# Patient Record
Sex: MALE | Race: WHITE | Employment: UNEMPLOYED | URBAN - METROPOLITAN AREA
[De-identification: names, ages, dates, MRNs, and addresses within clinical notes are randomized per-mention and may not be internally consistent; named-entity substitution may affect disease eponyms.]

---

## 2019-07-18 ENCOUNTER — HOSPITAL ENCOUNTER (INPATIENT)
Age: 34
LOS: 3 days | Discharge: HOME | DRG: 439 | End: 2019-07-22
Attending: EMERGENCY MEDICINE | Admitting: HOSPITALIST
Payer: COMMERCIAL

## 2019-07-18 ENCOUNTER — APPOINTMENT (OUTPATIENT)
Dept: CT IMAGING | Age: 34
DRG: 439 | End: 2019-07-18
Attending: EMERGENCY MEDICINE
Payer: COMMERCIAL

## 2019-07-18 DIAGNOSIS — K85.90 ACUTE PANCREATITIS, UNSPECIFIED COMPLICATION STATUS, UNSPECIFIED PANCREATITIS TYPE: Primary | ICD-10-CM

## 2019-07-18 LAB
APPEARANCE UR: CLEAR
BACTERIA URNS QL MICRO: NEGATIVE /HPF
BASOPHILS # BLD: 0 K/UL (ref 0–0.1)
BASOPHILS NFR BLD: 0 % (ref 0–1)
BILIRUB UR QL: NEGATIVE
COLOR UR: ABNORMAL
DIFFERENTIAL METHOD BLD: ABNORMAL
EOSINOPHIL # BLD: 0 K/UL (ref 0–0.4)
EOSINOPHIL NFR BLD: 0 % (ref 0–7)
EPITH CASTS URNS QL MICRO: ABNORMAL /LPF
ERYTHROCYTE [DISTWIDTH] IN BLOOD BY AUTOMATED COUNT: 12.5 % (ref 11.5–14.5)
GLUCOSE UR STRIP.AUTO-MCNC: 100 MG/DL
HCT VFR BLD AUTO: 49.1 % (ref 36.6–50.3)
HGB BLD-MCNC: 16.7 G/DL (ref 12.1–17)
HGB UR QL STRIP: ABNORMAL
HYALINE CASTS URNS QL MICRO: ABNORMAL /LPF (ref 0–5)
IMM GRANULOCYTES # BLD AUTO: 0 K/UL
IMM GRANULOCYTES NFR BLD AUTO: 0 %
KETONES UR QL STRIP.AUTO: NEGATIVE MG/DL
LEUKOCYTE ESTERASE UR QL STRIP.AUTO: NEGATIVE
LIPASE SERPL-CCNC: >3000 U/L (ref 73–393)
LYMPHOCYTES # BLD: 0 K/UL (ref 0.8–3.5)
LYMPHOCYTES NFR BLD: 0 % (ref 12–49)
MCH RBC QN AUTO: 30.6 PG (ref 26–34)
MCHC RBC AUTO-ENTMCNC: 34 G/DL (ref 30–36.5)
MCV RBC AUTO: 90.1 FL (ref 80–99)
MONOCYTES # BLD: 0.4 K/UL (ref 0–1)
MONOCYTES NFR BLD: 2 % (ref 5–13)
NEUTS SEG # BLD: 18.2 K/UL (ref 1.8–8)
NEUTS SEG NFR BLD: 98 % (ref 32–75)
NITRITE UR QL STRIP.AUTO: NEGATIVE
NRBC # BLD: 0 K/UL (ref 0–0.01)
NRBC BLD-RTO: 0 PER 100 WBC
PH UR STRIP: 6 [PH] (ref 5–8)
PLATELET # BLD AUTO: 221 K/UL (ref 150–400)
PMV BLD AUTO: 11.6 FL (ref 8.9–12.9)
PROT UR STRIP-MCNC: 100 MG/DL
RBC # BLD AUTO: 5.45 M/UL (ref 4.1–5.7)
RBC #/AREA URNS HPF: ABNORMAL /HPF (ref 0–5)
RBC MORPH BLD: ABNORMAL
SP GR UR REFRACTOMETRY: >1.03 (ref 1–1.03)
UROBILINOGEN UR QL STRIP.AUTO: 0.2 EU/DL (ref 0.2–1)
WBC # BLD AUTO: 18.6 K/UL (ref 4.1–11.1)
WBC URNS QL MICRO: ABNORMAL /HPF (ref 0–4)

## 2019-07-18 PROCEDURE — 85025 COMPLETE CBC W/AUTO DIFF WBC: CPT

## 2019-07-18 PROCEDURE — 81001 URINALYSIS AUTO W/SCOPE: CPT

## 2019-07-18 PROCEDURE — 80061 LIPID PANEL: CPT

## 2019-07-18 PROCEDURE — 80053 COMPREHEN METABOLIC PANEL: CPT

## 2019-07-18 PROCEDURE — 36415 COLL VENOUS BLD VENIPUNCTURE: CPT

## 2019-07-18 PROCEDURE — 84100 ASSAY OF PHOSPHORUS: CPT

## 2019-07-18 PROCEDURE — 83690 ASSAY OF LIPASE: CPT

## 2019-07-18 PROCEDURE — 96361 HYDRATE IV INFUSION ADD-ON: CPT

## 2019-07-18 PROCEDURE — 74011000258 HC RX REV CODE- 258: Performed by: RADIOLOGY

## 2019-07-18 PROCEDURE — 99284 EMERGENCY DEPT VISIT MOD MDM: CPT

## 2019-07-18 PROCEDURE — 74011636320 HC RX REV CODE- 636/320: Performed by: RADIOLOGY

## 2019-07-18 PROCEDURE — 96375 TX/PRO/DX INJ NEW DRUG ADDON: CPT

## 2019-07-18 PROCEDURE — 74177 CT ABD & PELVIS W/CONTRAST: CPT

## 2019-07-18 PROCEDURE — 83735 ASSAY OF MAGNESIUM: CPT

## 2019-07-18 PROCEDURE — 96374 THER/PROPH/DIAG INJ IV PUSH: CPT

## 2019-07-18 PROCEDURE — 96376 TX/PRO/DX INJ SAME DRUG ADON: CPT

## 2019-07-18 PROCEDURE — 74011250636 HC RX REV CODE- 250/636: Performed by: EMERGENCY MEDICINE

## 2019-07-18 RX ORDER — ONDANSETRON 2 MG/ML
4 INJECTION INTRAMUSCULAR; INTRAVENOUS
Status: COMPLETED | OUTPATIENT
Start: 2019-07-18 | End: 2019-07-18

## 2019-07-18 RX ORDER — SODIUM CHLORIDE 0.9 % (FLUSH) 0.9 %
10 SYRINGE (ML) INJECTION
Status: COMPLETED | OUTPATIENT
Start: 2019-07-18 | End: 2019-07-18

## 2019-07-18 RX ORDER — HYDROMORPHONE HYDROCHLORIDE 2 MG/ML
0.5 INJECTION, SOLUTION INTRAMUSCULAR; INTRAVENOUS; SUBCUTANEOUS ONCE
Status: COMPLETED | OUTPATIENT
Start: 2019-07-18 | End: 2019-07-18

## 2019-07-18 RX ORDER — HYDROMORPHONE HYDROCHLORIDE 1 MG/ML
0.5 INJECTION, SOLUTION INTRAMUSCULAR; INTRAVENOUS; SUBCUTANEOUS ONCE
Status: COMPLETED | OUTPATIENT
Start: 2019-07-18 | End: 2019-07-19

## 2019-07-18 RX ADMIN — HYDROMORPHONE HYDROCHLORIDE 0.5 MG: 2 INJECTION, SOLUTION INTRAMUSCULAR; INTRAVENOUS; SUBCUTANEOUS at 22:01

## 2019-07-18 RX ADMIN — IOPAMIDOL 100 ML: 755 INJECTION, SOLUTION INTRAVENOUS at 23:01

## 2019-07-18 RX ADMIN — Medication 10 ML: at 23:01

## 2019-07-18 RX ADMIN — SODIUM CHLORIDE 100 ML: 900 INJECTION, SOLUTION INTRAVENOUS at 23:01

## 2019-07-18 RX ADMIN — SODIUM CHLORIDE 1000 ML: 900 INJECTION, SOLUTION INTRAVENOUS at 22:01

## 2019-07-18 RX ADMIN — ONDANSETRON 4 MG: 2 INJECTION INTRAMUSCULAR; INTRAVENOUS at 22:01

## 2019-07-19 ENCOUNTER — APPOINTMENT (OUTPATIENT)
Dept: ULTRASOUND IMAGING | Age: 34
DRG: 439 | End: 2019-07-19
Attending: HOSPITALIST
Payer: COMMERCIAL

## 2019-07-19 PROBLEM — K85.90 ACUTE PANCREATITIS: Status: ACTIVE | Noted: 2019-07-19

## 2019-07-19 LAB
ALBUMIN SERPL-MCNC: 4.4 G/DL (ref 3.5–5)
ALBUMIN/GLOB SERPL: 1.3 {RATIO} (ref 1.1–2.2)
ALP SERPL-CCNC: 89 U/L (ref 45–117)
ALT SERPL-CCNC: 27 U/L (ref 12–78)
ANION GAP SERPL CALC-SCNC: 10 MMOL/L (ref 5–15)
AST SERPL-CCNC: 19 U/L (ref 15–37)
BILIRUB SERPL-MCNC: 0.9 MG/DL (ref 0.2–1)
BUN SERPL-MCNC: 15 MG/DL (ref 6–20)
BUN/CREAT SERPL: 14 (ref 12–20)
CALCIUM SERPL-MCNC: 9.8 MG/DL (ref 8.5–10.1)
CHLORIDE SERPL-SCNC: 102 MMOL/L (ref 97–108)
CHOLEST SERPL-MCNC: 175 MG/DL
CO2 SERPL-SCNC: 25 MMOL/L (ref 21–32)
CREAT SERPL-MCNC: 1.07 MG/DL (ref 0.7–1.3)
GLOBULIN SER CALC-MCNC: 3.4 G/DL (ref 2–4)
GLUCOSE SERPL-MCNC: 157 MG/DL (ref 65–100)
HDLC SERPL-MCNC: 66 MG/DL
HDLC SERPL: 2.7 {RATIO} (ref 0–5)
LDLC SERPL CALC-MCNC: 101.8 MG/DL (ref 0–100)
LIPID PROFILE,FLP: ABNORMAL
MAGNESIUM SERPL-MCNC: 1.9 MG/DL (ref 1.6–2.4)
PHOSPHATE SERPL-MCNC: 3.4 MG/DL (ref 2.6–4.7)
POTASSIUM SERPL-SCNC: 4.4 MMOL/L (ref 3.5–5.1)
PROT SERPL-MCNC: 7.8 G/DL (ref 6.4–8.2)
SODIUM SERPL-SCNC: 137 MMOL/L (ref 136–145)
TRIGL SERPL-MCNC: 36 MG/DL (ref ?–150)
VLDLC SERPL CALC-MCNC: 7.2 MG/DL

## 2019-07-19 PROCEDURE — 76705 ECHO EXAM OF ABDOMEN: CPT

## 2019-07-19 PROCEDURE — 74011250637 HC RX REV CODE- 250/637: Performed by: HOSPITALIST

## 2019-07-19 PROCEDURE — 65270000029 HC RM PRIVATE

## 2019-07-19 PROCEDURE — 74011250636 HC RX REV CODE- 250/636: Performed by: NURSE PRACTITIONER

## 2019-07-19 PROCEDURE — 96361 HYDRATE IV INFUSION ADD-ON: CPT

## 2019-07-19 PROCEDURE — 74011000258 HC RX REV CODE- 258: Performed by: HOSPITALIST

## 2019-07-19 PROCEDURE — 74011250636 HC RX REV CODE- 250/636: Performed by: HOSPITALIST

## 2019-07-19 PROCEDURE — 74011250636 HC RX REV CODE- 250/636: Performed by: EMERGENCY MEDICINE

## 2019-07-19 RX ORDER — HYDROMORPHONE HYDROCHLORIDE 1 MG/ML
1 INJECTION, SOLUTION INTRAMUSCULAR; INTRAVENOUS; SUBCUTANEOUS
Status: DISCONTINUED | OUTPATIENT
Start: 2019-07-19 | End: 2019-07-22 | Stop reason: HOSPADM

## 2019-07-19 RX ORDER — ONDANSETRON 2 MG/ML
4 INJECTION INTRAMUSCULAR; INTRAVENOUS
Status: DISCONTINUED | OUTPATIENT
Start: 2019-07-19 | End: 2019-07-22 | Stop reason: HOSPADM

## 2019-07-19 RX ORDER — ENOXAPARIN SODIUM 100 MG/ML
40 INJECTION SUBCUTANEOUS EVERY 24 HOURS
Status: DISCONTINUED | OUTPATIENT
Start: 2019-07-19 | End: 2019-07-22 | Stop reason: HOSPADM

## 2019-07-19 RX ORDER — ACETAMINOPHEN 325 MG/1
650 TABLET ORAL
Status: DISCONTINUED | OUTPATIENT
Start: 2019-07-19 | End: 2019-07-22 | Stop reason: HOSPADM

## 2019-07-19 RX ORDER — SODIUM CHLORIDE 0.9 % (FLUSH) 0.9 %
5-40 SYRINGE (ML) INJECTION AS NEEDED
Status: DISCONTINUED | OUTPATIENT
Start: 2019-07-19 | End: 2019-07-22 | Stop reason: HOSPADM

## 2019-07-19 RX ORDER — SODIUM CHLORIDE 0.9 % (FLUSH) 0.9 %
5-40 SYRINGE (ML) INJECTION EVERY 8 HOURS
Status: DISCONTINUED | OUTPATIENT
Start: 2019-07-19 | End: 2019-07-22 | Stop reason: HOSPADM

## 2019-07-19 RX ORDER — METRONIDAZOLE 500 MG/100ML
500 INJECTION, SOLUTION INTRAVENOUS EVERY 12 HOURS
Status: DISCONTINUED | OUTPATIENT
Start: 2019-07-19 | End: 2019-07-19

## 2019-07-19 RX ORDER — SODIUM CHLORIDE, SODIUM LACTATE, POTASSIUM CHLORIDE, CALCIUM CHLORIDE 600; 310; 30; 20 MG/100ML; MG/100ML; MG/100ML; MG/100ML
200 INJECTION, SOLUTION INTRAVENOUS CONTINUOUS
Status: DISCONTINUED | OUTPATIENT
Start: 2019-07-19 | End: 2019-07-22

## 2019-07-19 RX ORDER — OXYCODONE HYDROCHLORIDE 5 MG/1
5 TABLET ORAL
Status: DISCONTINUED | OUTPATIENT
Start: 2019-07-19 | End: 2019-07-22 | Stop reason: HOSPADM

## 2019-07-19 RX ADMIN — HYDROMORPHONE HYDROCHLORIDE 1 MG: 1 INJECTION, SOLUTION INTRAMUSCULAR; INTRAVENOUS; SUBCUTANEOUS at 05:56

## 2019-07-19 RX ADMIN — METRONIDAZOLE 500 MG: 500 INJECTION, SOLUTION INTRAVENOUS at 02:08

## 2019-07-19 RX ADMIN — SODIUM CHLORIDE 1000 ML: 900 INJECTION, SOLUTION INTRAVENOUS at 00:10

## 2019-07-19 RX ADMIN — CEFEPIME HYDROCHLORIDE 2 G: 2 INJECTION, POWDER, FOR SOLUTION INTRAVENOUS at 03:48

## 2019-07-19 RX ADMIN — ONDANSETRON 4 MG: 2 INJECTION INTRAMUSCULAR; INTRAVENOUS at 20:45

## 2019-07-19 RX ADMIN — HYDROMORPHONE HYDROCHLORIDE 0.5 MG: 1 INJECTION, SOLUTION INTRAMUSCULAR; INTRAVENOUS; SUBCUTANEOUS at 00:10

## 2019-07-19 RX ADMIN — OXYCODONE HYDROCHLORIDE 5 MG: 5 TABLET ORAL at 12:18

## 2019-07-19 RX ADMIN — ENOXAPARIN SODIUM 40 MG: 40 INJECTION SUBCUTANEOUS at 08:42

## 2019-07-19 RX ADMIN — SODIUM CHLORIDE, SODIUM LACTATE, POTASSIUM CHLORIDE, AND CALCIUM CHLORIDE 150 ML/HR: 600; 310; 30; 20 INJECTION, SOLUTION INTRAVENOUS at 00:06

## 2019-07-19 RX ADMIN — Medication 10 ML: at 05:56

## 2019-07-19 RX ADMIN — HYDROMORPHONE HYDROCHLORIDE 1 MG: 1 INJECTION, SOLUTION INTRAMUSCULAR; INTRAVENOUS; SUBCUTANEOUS at 02:13

## 2019-07-19 RX ADMIN — ACETAMINOPHEN 650 MG: 325 TABLET ORAL at 20:45

## 2019-07-19 RX ADMIN — ONDANSETRON 4 MG: 2 INJECTION INTRAMUSCULAR; INTRAVENOUS at 14:08

## 2019-07-19 RX ADMIN — OXYCODONE HYDROCHLORIDE 5 MG: 5 TABLET ORAL at 19:36

## 2019-07-19 RX ADMIN — SODIUM CHLORIDE, SODIUM LACTATE, POTASSIUM CHLORIDE, AND CALCIUM CHLORIDE 200 ML/HR: 600; 310; 30; 20 INJECTION, SOLUTION INTRAVENOUS at 15:34

## 2019-07-19 RX ADMIN — ONDANSETRON 4 MG: 2 INJECTION INTRAMUSCULAR; INTRAVENOUS at 05:56

## 2019-07-19 RX ADMIN — OXYCODONE HYDROCHLORIDE 5 MG: 5 TABLET ORAL at 08:07

## 2019-07-19 RX ADMIN — ONDANSETRON 4 MG: 2 INJECTION INTRAMUSCULAR; INTRAVENOUS at 02:12

## 2019-07-19 RX ADMIN — HYDROMORPHONE HYDROCHLORIDE 1 MG: 1 INJECTION, SOLUTION INTRAMUSCULAR; INTRAVENOUS; SUBCUTANEOUS at 20:45

## 2019-07-19 RX ADMIN — Medication 10 ML: at 02:13

## 2019-07-19 RX ADMIN — HYDROMORPHONE HYDROCHLORIDE 1 MG: 1 INJECTION, SOLUTION INTRAMUSCULAR; INTRAVENOUS; SUBCUTANEOUS at 14:08

## 2019-07-19 NOTE — ED NOTES
Hospitalist TigerText for Admission  11:35 PM    ED Room Number: ER14/14  Patient Name and age:  Clau Julien 29 y.o.  male  Working Diagnosis:   1. Acute pancreatitis, unspecified complication status, unspecified pancreatitis type      Readmission: no  Isolation Requirements:  no  Recommended Level of Care:  med/surg  Code Status:  Full Code  Other:  29year old male presents with severe abdominal pain. Lipase >3000, WBC 18K, CT c/w pancreatitis, possible necrosis of head/body.

## 2019-07-19 NOTE — ED PROVIDER NOTES
29 y.o. male with no significant past medical history who presents from home with chief complaint of abdominal pain. Pt reports he began having mild LLQ abdominal pain ~2100 last night after eating accompanied by an episode of V/D earlier today, \"feeling hot and cold\", and diaphoresis. He states his pain has been constant, but fluctuating, since last night. He rates his current pain as 9/10 which is worsened by movement, hitting bumps during the car ride, and by external pressure. Pt states he was evaluated at [facility] where his sx were attributed to possible bacterial infection and he was referred to the ED. In addition, Pt notes he got a tick bite on his L foot ~1 week ago, but he removed the entire tick at that time. When asked about surgery Pt reports ear surgery many years ago and ankle surgery while in high school. He denies headache, rash, myalgias, and recent abdominal injury. There are no other acute medical concerns at this time. Social hx: works outside as .    Note written by Tamera Gaitan, as dictated by Dalton Kwan MD 9:30 PM           History reviewed. No pertinent past medical history. Past Surgical History:   Procedure Laterality Date    HX ORTHOPAEDIC      left ankle         History reviewed. No pertinent family history. Social History     Socioeconomic History    Marital status: SINGLE     Spouse name: Not on file    Number of children: Not on file    Years of education: Not on file    Highest education level: Not on file   Occupational History    Not on file   Social Needs    Financial resource strain: Not on file    Food insecurity:     Worry: Not on file     Inability: Not on file    Transportation needs:     Medical: Not on file     Non-medical: Not on file   Tobacco Use    Smoking status: Current Every Day Smoker   Substance and Sexual Activity    Alcohol use:  Yes    Drug use: Not on file    Sexual activity: Not on file Lifestyle    Physical activity:     Days per week: Not on file     Minutes per session: Not on file    Stress: Not on file   Relationships    Social connections:     Talks on phone: Not on file     Gets together: Not on file     Attends [employer] service: Not on file     Active member of club or organization: Not on file     Attends meetings of clubs or organizations: Not on file     Relationship status: Not on file    Intimate partner violence:     Fear of current or ex partner: Not on file     Emotionally abused: Not on file     Physically abused: Not on file     Forced sexual activity: Not on file   Other Topics Concern    Not on file   Social History Narrative    Not on file         ALLERGIES: Augmentin [amoxicillin-pot clavulanate]    Review of Systems   Constitutional: Positive for diaphoresis. Negative for appetite change, chills and fever. HENT: Negative for rhinorrhea, sore throat and trouble swallowing. Eyes: Negative for photophobia. Respiratory: Negative for cough and shortness of breath. Cardiovascular: Negative for chest pain and palpitations. Gastrointestinal: Positive for abdominal pain, diarrhea, nausea and vomiting. Genitourinary: Negative for dysuria, frequency and hematuria. Musculoskeletal: Negative for arthralgias and myalgias. Skin: Negative for rash. Neurological: Negative for dizziness, syncope, weakness and headaches. Psychiatric/Behavioral: Negative for behavioral problems. The patient is not nervous/anxious. All other systems reviewed and are negative. Vitals:    07/18/19 2107   Pulse: 76   SpO2: 99%            Physical Exam   Constitutional: He is oriented to person, place, and time. He appears well-developed and well-nourished. No distress. Appears in moderate pain, no distress. HENT:   Head: Normocephalic and atraumatic. Eyes: Conjunctivae and EOM are normal.   Neck: Normal range of motion. Neck supple.    Cardiovascular: Normal rate, regular rhythm and normal heart sounds. Pulmonary/Chest: Effort normal and breath sounds normal. No respiratory distress. Abdominal: Soft. Bowel sounds are normal. He exhibits no distension. There is tenderness. LUQ, LLQ abdominal tenderness. Musculoskeletal: Normal range of motion. Neurological: He is alert and oriented to person, place, and time. Skin: Skin is warm and dry. Several small insect bites over bilateral legs. Psychiatric: He has a normal mood and affect. His behavior is normal. Judgment and thought content normal.   Nursing note and vitals reviewed. Note written by Tamera Stock, as dictated by Balaji Sandoval MD 9:30 PM    [facility]       Procedures        PROGRESS NOTE:  10:16 PM  Balaji Sandoval MD reviewed records from [facility]. WBC count was 20.7k, urine contained 1-5 WBC's, urine was negative for WBC esterase, blood glucose was 173.      10:31 PM  Change of shift. Care of patient signed over to Dr. Julio Merida. Bedside handoff complete.

## 2019-07-19 NOTE — ED NOTES
Assumed care of patient at change of shift. Labs/CT scan pending. Markedly tender on exam with 24 hours of worsening left sided abdominal pain. From [facility] with 20K WBC. No past surgical/gi history. Does report alcohol use.

## 2019-07-19 NOTE — H&P
HISTORY AND PHYSICAL      PCP: None  History source: the patient      CC: abdominal pain      HPI: 29 y.o man presents with abdominal pain. Symptoms began on the night of 7/17/19 after eating dinner and drinking a beer. The pain is sharp in the epigastrium and LUQ. It was constant with intermittent flares, non-radiating. Symptoms are exacerbated by moving and eating, no known alleviating factors, associated symptoms include nausea, vomiting, no diarrhea, constipation, bleeding, or fever. The symptoms progressively worsened prompting him to come to the ED. He denies similar symptoms in the past. He denies heavy alcohol use, states he drinks about 3 times a week and denies binge drinking. PMH/PSH:  History reviewed. No pertinent past medical history. Past Surgical History:   Procedure Laterality Date    HX ORTHOPAEDIC      left ankle       Home meds:   Prior to Admission medications    Medication Sig Start Date End Date Taking? Authorizing Provider   fluticasone (FLONASE) 50 mcg/actuation nasal spray 2 Sprays by Both Nostrils route daily. 1 spray in each nostril. 11/30/12   Chioma Bridges MD       Allergies: Allergies   Allergen Reactions    Augmentin [Amoxicillin-Pot Clavulanate] Unknown (comments)       FH:  History reviewed. No pertinent family history. SH:  Social History     Tobacco Use    Smoking status: Current Every Day Smoker   Substance Use Topics    Alcohol use: Yes       ROS: A comprehensive review of systems was negative except for that written in the HPI.       PHYSICAL EXAM:  Visit Vitals  /90   Pulse 69   Temp 98.9 °F (37.2 °C)   Resp 18   SpO2 100%       Gen: NAD, non-toxic  HEENT: anicteric sclerae, normal conjunctiva, oropharynx clear, MM dry  Neck: supple, trachea midline, no adenopathy  Heart: RRR, no MRG, no JVD, no peripheral edema  Lungs: CTA b/l, non-labored respirations  Abd: soft, diffusely tender most notably in the epigastrium and LUQ, ND, BS+, no organomegaly  Extr: warm  Skin: dry, no rash  Neuro: CN II-XII grossly intact, normal speech, moves all extremities  Psych: normal mood, appropriate affect      Labs/Imaging:  Recent Results (from the past 24 hour(s))   CBC WITH AUTOMATED DIFF    Collection Time: 07/18/19 10:13 PM   Result Value Ref Range    WBC 18.6 (H) 4.1 - 11.1 K/uL    RBC 5.45 4.10 - 5.70 M/uL    HGB 16.7 12.1 - 17.0 g/dL    HCT 49.1 36.6 - 50.3 %    MCV 90.1 80.0 - 99.0 FL    MCH 30.6 26.0 - 34.0 PG    MCHC 34.0 30.0 - 36.5 g/dL    RDW 12.5 11.5 - 14.5 %    PLATELET 113 438 - 445 K/uL    MPV 11.6 8.9 - 12.9 FL    NRBC 0.0 0  WBC    ABSOLUTE NRBC 0.00 0.00 - 0.01 K/uL    NEUTROPHILS 98 (H) 32 - 75 %    LYMPHOCYTES 0 (L) 12 - 49 %    MONOCYTES 2 (L) 5 - 13 %    EOSINOPHILS 0 0 - 7 %    BASOPHILS 0 0 - 1 %    IMMATURE GRANULOCYTES 0 %    ABS. NEUTROPHILS 18.2 (H) 1.8 - 8.0 K/UL    ABS. LYMPHOCYTES 0.0 (L) 0.8 - 3.5 K/UL    ABS. MONOCYTES 0.4 0.0 - 1.0 K/UL    ABS. EOSINOPHILS 0.0 0.0 - 0.4 K/UL    ABS. BASOPHILS 0.0 0.0 - 0.1 K/UL    ABS. IMM.  GRANS. 0.0 K/UL    DF MANUAL      RBC COMMENTS NORMOCYTIC, NORMOCHROMIC     LIPASE    Collection Time: 07/18/19 10:13 PM   Result Value Ref Range    Lipase >3,000 (H) 73 - 393 U/L   URINALYSIS W/MICROSCOPIC    Collection Time: 07/18/19 10:13 PM   Result Value Ref Range    Color DARK YELLOW      Appearance CLEAR CLEAR      Specific gravity >1.030 (H) 1.003 - 1.030    pH (UA) 6.0 5.0 - 8.0      Protein 100 (A) NEG mg/dL    Glucose 100 (A) NEG mg/dL    Ketone NEGATIVE  NEG mg/dL    Bilirubin NEGATIVE  NEG      Blood MODERATE (A) NEG      Urobilinogen 0.2 0.2 - 1.0 EU/dL    Nitrites NEGATIVE  NEG      Leukocyte Esterase NEGATIVE  NEG      WBC 0-4 0 - 4 /hpf    RBC 5-10 0 - 5 /hpf    Epithelial cells FEW FEW /lpf    Bacteria NEGATIVE  NEG /hpf    Hyaline cast 0-2 0 - 5 /lpf   METABOLIC PANEL, COMPREHENSIVE    Collection Time: 07/18/19 10:13 PM   Result Value Ref Range    Sodium 137 136 - 145 mmol/L Potassium 4.4 3.5 - 5.1 mmol/L    Chloride 102 97 - 108 mmol/L    CO2 25 21 - 32 mmol/L    Anion gap 10 5 - 15 mmol/L    Glucose 157 (H) 65 - 100 mg/dL    BUN 15 6 - 20 MG/DL    Creatinine 1.07 0.70 - 1.30 MG/DL    BUN/Creatinine ratio 14 12 - 20      GFR est AA >60 >60 ml/min/1.73m2    GFR est non-AA >60 >60 ml/min/1.73m2    Calcium 9.8 8.5 - 10.1 MG/DL    Bilirubin, total 0.9 0.2 - 1.0 MG/DL    ALT (SGPT) 27 12 - 78 U/L    AST (SGOT) 19 15 - 37 U/L    Alk. phosphatase 89 45 - 117 U/L    Protein, total 7.8 6.4 - 8.2 g/dL    Albumin 4.4 3.5 - 5.0 g/dL    Globulin 3.4 2.0 - 4.0 g/dL    A-G Ratio 1.3 1.1 - 2.2         Recent Labs     07/18/19  2213   WBC 18.6*   HGB 16.7   HCT 49.1        Recent Labs     07/18/19  2213      K 4.4      CO2 25   BUN 15   CREA 1.07   *   CA 9.8     Recent Labs     07/18/19  2213   SGOT 19   ALT 27   AP 89   TBILI 0.9   TP 7.8   ALB 4.4   GLOB 3.4   LPSE >3,000*       No results for input(s): CPK, CKNDX, TROIQ in the last 72 hours. No lab exists for component: CPKMB    No results for input(s): INR, PTP, APTT in the last 72 hours. No lab exists for component: INREXT     No results for input(s): PH, PCO2, PO2 in the last 72 hours. Ct Abd Pelv W Cont    Result Date: 7/18/2019  IMPRESSION: 1. Pancreatitis. Possible developing necrosis of the head and body. No drainable fluid collection. 2. Small volume of ascites. 3. Transverse colon and distal colon colitis versus under distention. Recommendation: CT pancreas in 4-6 weeks to reassess.           Assessment & Plan:     Acute pancreatitis: unclear etiology, could be alcohol-related but he denies heavy drinking  -bowel rest  -IV fluids  -pain control w/ PRN IV hydromorphone  -check lipid panel, RUQ ultrasound  -will place on IV cefepime and Flagyl due to possible developing necrosis  -consult GI in AM    Marijuana use    DVT ppx: sq Lovenox  Code status: full  Disposition: home when ready    Signed By: Giancarlo Rodriguez MD Yemi     July 19, 2019

## 2019-07-19 NOTE — PROGRESS NOTES
Bedside and Verbal shift change report given to Suhas Morataya RN (oncoming nurse) by Jessica Laguna RN (offgoing nurse). Report included the following information SBAR, Kardex, ED Summary, Procedure Summary, Intake/Output, MAR and Recent Results.

## 2019-07-19 NOTE — ROUTINE PROCESS
TRANSFER - OUT REPORT:    Verbal report given to RN(name) on Chong Redd  being transferred to [facility](unit) for routine progression of care       Report consisted of patients Situation, Background, Assessment and   Recommendations(SBAR). Information from the following report(s) SBAR, Kardex, ED Summary, Intake/Output, MAR and Recent Results was reviewed with the receiving nurse. Lines:   Peripheral IV 07/18/19 Right (Active)   Site Assessment Clean, dry, & intact 7/18/2019 10:06 PM   Phlebitis Assessment 0 7/18/2019 10:06 PM   Infiltration Assessment 0 7/18/2019 10:06 PM   Dressing Status Clean, dry, & intact 7/18/2019 10:06 PM   Hub Color/Line Status Pink 7/18/2019 10:06 PM        Opportunity for questions and clarification was provided.       Patient transported with:  transportation from [facility]

## 2019-07-19 NOTE — PROGRESS NOTES
TRANSFER - IN REPORT:    Verbal report received from Rome montanez RN(name) on Chong Redd  being received from ED(unit) for routine progression of care      Report consisted of patients Situation, Background, Assessment and   Recommendations(SBAR). Information from the following report(s) SBAR, Kardex, ED Summary, Procedure Summary, Intake/Output, MAR and Recent Results was reviewed with the receiving nurse. Opportunity for questions and clarification was provided. Assessment completed upon patients arrival to unit and care assumed.

## 2019-07-19 NOTE — PROGRESS NOTES
Addended by: SVEN LOREDO on: 8/17/2018 12:25 PM     Modules accepted: Orders     Hospitalist Progress Note  Raymon Alcala MD  Answering service: [phone] from in house phone  Cell: [phone]      Date of Service:  2019  NAME:  Lili Turcios  :  1985  MRN:  [id number]      Admission Summary:     A 29 y.o man presents with abdominal pain. Symptoms began on the night of 19 after eating dinner and drinking a beer. The pain is sharp in the epigastrium and LUQ. It was constant with intermittent flares, non-radiating. Symptoms are exacerbated by moving and eating, no known alleviating factors, associated symptoms include nausea, vomiting, no diarrhea, constipation, bleeding, or fever. The symptoms progressively worsened prompting him to come to the ED. He denies similar symptoms in the past. He denies heavy alcohol use, states he drinks about 3 times a week and denies binge drinking. Interval history / Subjective:     Abdominal pain improving, no vomiting     Assessment & Plan:     Acute pancreatitis   -NPO, lactated ringer and prn iv dilaudid   - iv cefepime and metronidazole discontinued on   -lipase >3000, TG normal  -CT abdomen pelvis pancreatitis. Possible developing necrosis of the head and body. No drainable fluid collection. Small volume of ascites. Transverse colon and distal colon colitis versus under distention. Recommendation: CT pancreas in 4-6 weeks to reassess  -US of abdomen acute pancreatitis, better appreciated on the comparison CT, normal gallbladder and CBD.    -GI on board    Leukocytosis likely due to acute pancreatitis  -afebrile         Code status: Full Code   DVT prophylaxis: lovenox    Care Plan discussed with: Patient/Family and Nurse  Disposition: TBD     Hospital Problems  Never Reviewed          Codes Class Noted POA    * (Principal) Acute pancreatitis ICD-10-CM: K85.90  ICD-9-CM: 126.4  2019 Unknown              Vital Signs:    Last 24hrs VS reviewed since prior progress note. Most recent are:  Visit Vitals  BP (!) 169/96 (BP Patient Position: At rest)   Pulse 62   Temp 98 °F (36.7 °C)   Resp 16   Ht 5' 9\" (1.753 m)   Wt 73.5 kg (162 lb)   SpO2 100%   BMI 23.92 kg/m²       No intake or output data in the 24 hours ending 07/19/19 1130     Physical Examination:             Constitutional:  No acute distress, cooperative, pleasant    ENT:  Oral mucous moist, oropharynx benign. Neck supple,    Resp:  CTA bilaterally. No wheezing/rhonchi/rales. No accessory muscle use   CV:  Regular rhythm, normal rate, no murmurs, gallops, rubs    GI:  Soft, non distended, non tender. normoactive bowel sounds, no hepatosplenomegaly     Musculoskeletal:  No edema    Neurologic:  Moves all extremities. AAOx3, CN II-XII reviewed     Skin:  Good turgor, no rashes or ulcers       Data Review:    Review and/or order of clinical lab test  Review and/or order of tests in the radiology section of CPT  Review and/or order of tests in the medicine section of CPT      Labs:     Recent Labs     07/18/19  2213   WBC 18.6*   HGB 16.7   HCT 49.1        Recent Labs     07/18/19  2213      K 4.4      CO2 25   BUN 15   CREA 1.07   *   CA 9.8   MG 1.9   PHOS 3.4     Recent Labs     07/18/19  2213   SGOT 19   ALT 27   AP 89   TBILI 0.9   TP 7.8   ALB 4.4   GLOB 3.4   LPSE >3,000*     No results for input(s): INR, PTP, APTT in the last 72 hours. No lab exists for component: INREXT, INREXT   No results for input(s): FE, TIBC, PSAT, FERR in the last 72 hours. No results found for: FOL, RBCF   No results for input(s): PH, PCO2, PO2 in the last 72 hours. No results for input(s): CPK, CKNDX, TROIQ in the last 72 hours.     No lab exists for component: CPKMB  Lab Results   Component Value Date/Time    Cholesterol, total 175 07/18/2019 10:13 PM    HDL Cholesterol 66 07/18/2019 10:13 PM    LDL, calculated 101.8 (H) 07/18/2019 10:13 PM    Triglyceride 36 07/18/2019 10:13 PM    CHOL/HDL Ratio 2.7 07/18/2019 10:13 PM     No results found for: Carlos Buchanan  Lab Results   Component Value Date/Time    Color DARK YELLOW 07/18/2019 10:13 PM    Appearance CLEAR 07/18/2019 10:13 PM    Specific gravity >1.030 (H) 07/18/2019 10:13 PM    pH (UA) 6.0 07/18/2019 10:13 PM    Protein 100 (A) 07/18/2019 10:13 PM    Glucose 100 (A) 07/18/2019 10:13 PM    Ketone NEGATIVE  07/18/2019 10:13 PM    Bilirubin NEGATIVE  07/18/2019 10:13 PM    Urobilinogen 0.2 07/18/2019 10:13 PM    Nitrites NEGATIVE  07/18/2019 10:13 PM    Leukocyte Esterase NEGATIVE  07/18/2019 10:13 PM    Epithelial cells FEW 07/18/2019 10:13 PM    Bacteria NEGATIVE  07/18/2019 10:13 PM    WBC 0-4 07/18/2019 10:13 PM    RBC 5-10 07/18/2019 10:13 PM         Medications Reviewed:     Current Facility-Administered Medications   Medication Dose Route Frequency    sodium chloride (NS) flush 5-40 mL  5-40 mL IntraVENous Q8H    sodium chloride (NS) flush 5-40 mL  5-40 mL IntraVENous PRN    acetaminophen (TYLENOL) tablet 650 mg  650 mg Oral Q4H PRN    HYDROmorphone (PF) (DILAUDID) injection 1 mg  1 mg IntraVENous Q4H PRN    ondansetron (ZOFRAN) injection 4 mg  4 mg IntraVENous Q4H PRN    enoxaparin (LOVENOX) injection 40 mg  40 mg SubCUTAneous Q24H    lactated Ringers infusion  200 mL/hr IntraVENous CONTINUOUS    oxyCODONE IR (ROXICODONE) tablet 5 mg  5 mg Oral Q4H PRN     ______________________________________________________________________  EXPECTED LENGTH OF STAY: 3d 4h  ACTUAL LENGTH OF STAY:          0                 Asenath Hodgkin, MD

## 2019-07-19 NOTE — CONSULTS
118 [facility].  217 [facility] [address] [place]  1400 [facility], [address]  [phone]                     GI CONSULTATION NOTE  Migdalia Garces, AGACNP-BC  Work Cell: [phone]      NAME:  Wali Bazzi   :   1985   MRN:   [id number]       Referring Provider: Dr. Dayron Shepard Date: 2019     Chief Complaint: Abdominal pain    History of Present Illness:  Patient is a 29 y.o. who is seen in consultation at the request of Dr. Nuria Malone for pancreatitis. Mr. Ulysses Kearney is without any significant PMH whom presented to the hospital with abdominal pain. Onset of this was Wednesday. Pain is located in upper abdomen. Intensity waxes and wanes. There is associated nausea, vomiting and chills. No definite fevers. No diarrhea. Work-up revealing elevated WBC and lipase > 3000 and CT showed pancreatitis with possible developing necrosis. No fluid collections. Abd ultrasound was unremarkable. Triglycerides normal. Pt does not take any medications at home. Denies any tobacco use, but does admit to occasional marijuana use. Drinks alcohol 1-2 glasses of wine 5 nights of the week. No prior hx of similar symptoms. No known personal or family hx of pancreatitis. PMH:  History reviewed. No pertinent past medical history. PSH:  Past Surgical History:   Procedure Laterality Date    HX ORTHOPAEDIC      left ankle       Allergies: Allergies   Allergen Reactions    Augmentin [Amoxicillin-Pot Clavulanate] Unknown (comments)       Home Medications:  Prior to Admission Medications   Prescriptions Last Dose Informant Patient Reported? Taking?   fluticasone (FLONASE) 50 mcg/actuation nasal spray   No No   Si Sprays by Both Nostrils route daily. 1 spray in each nostril.       Facility-Administered Medications: None       Hospital Medications:  Current Facility-Administered Medications   Medication Dose Route Frequency    sodium chloride (NS) flush 5-40 mL  5-40 mL IntraVENous Q8H    sodium chloride (NS) flush 5-40 mL  5-40 mL IntraVENous PRN    acetaminophen (TYLENOL) tablet 650 mg  650 mg Oral Q4H PRN    HYDROmorphone (PF) (DILAUDID) injection 1 mg  1 mg IntraVENous Q4H PRN    ondansetron (ZOFRAN) injection 4 mg  4 mg IntraVENous Q4H PRN    enoxaparin (LOVENOX) injection 40 mg  40 mg SubCUTAneous Q24H    lactated Ringers infusion  200 mL/hr IntraVENous CONTINUOUS    cefepime (MAXIPIME) 2 g in 0.9% sodium chloride (MBP/ADV) 100 mL  2 g IntraVENous Q8H    metroNIDAZOLE (FLAGYL) IVPB premix 500 mg  500 mg IntraVENous Q12H    oxyCODONE IR (ROXICODONE) tablet 5 mg  5 mg Oral Q4H PRN       Social History:  Social History     Tobacco Use    Smoking status: Current Every Day Smoker   Substance Use Topics    Alcohol use: Yes       Family History:  History reviewed. No pertinent family history. Review of Systems:    Constitutional: negative fever, positive chills, negative weight loss  Eyes:   negative visual changes  ENT:   negative sore throat, tongue or lip swelling  Respiratory:  negative cough, negative dyspnea  Cards:  negative for chest pain, palpitations, lower extremity edema  GI:   See HPI  :  negative for frequency, dysuria  Integument:  negative for rash and pruritus  Heme:  negative for easy bruising and gum/nose bleeding  Musculoskel: negative for myalgias, back pain and muscle weakness  Neuro: negative for headaches, dizziness, vertigo  Psych:  negative for feelings of anxiety, depression      Objective:     Patient Vitals for the past 8 hrs:   BP Temp Pulse Resp SpO2   07/19/19 0840 (!) 169/96 98 °F (36.7 °C) 62 16 100 %   07/19/19 0443 (!) 169/95 98 °F (36.7 °C) 67 16 98 %     No intake/output data recorded. No intake/output data recorded. PHYSICAL EXAM:  General: WD, thin. Alert, cooperative, no acute distress.    HEENT: NC, Atraumatic. PERRLA, EOMI. Anicteric sclerae. Lungs:  CTA Bilaterally. No Wheezing/Rhonchi/Rales.   Heart:  Regular rate and rhythm, No murmur, No Rubs, No Gallops  Abdomen: Soft, non-distended, moderate epigastric/LUQ tenderness.  +Bowel sounds, no HSM  Extremities: No c/c/e  Neurologic:  Alert and oriented X 3. No acute neurological distress. Psych:   Good insight. Not anxious nor agitated. Data Review     Recent Labs     07/18/19  2213   WBC 18.6*   HGB 16.7   HCT 49.1        Recent Labs     07/18/19 2213      K 4.4      CO2 25   BUN 15   CREA 1.07   *   PHOS 3.4   CA 9.8     Recent Labs     07/18/19  2213   SGOT 19   AP 89   TP 7.8   ALB 4.4   GLOB 3.4   LPSE >3,000*     No results for input(s): INR, PTP, APTT in the last 72 hours. No lab exists for component: INREXT     Imaging studies reviewed      Assessment:   1. Acute pancreatitis - first episode, differentials including alcohol induced vs idiopathic etiology. Ultrasound w/o any biliary tract abnormalities. Triglycerides normal.      Patient Active Problem List   Diagnosis Code    Acute pancreatitis K85.90              Plan:   -NPO  -Supportive management per primary team  -Stop antibiotics  -Trend labs   -Discussed with Dr. Jessica Thibodeaux  -Will follow along with you  -Thank you kindly for allowing us to participate in the care of this patient    I have examined the patient. I have reviewed the chart and agree with the documentation recorded by the NP, including the assessment, treatment plan, and disposition. ASSESSMENT AND PLAN:  29 yr old male has presented with severe epigastric and LUQ pain associated with vomiting for past 2 days. He has history of significant alcohol intake and clinical picture is consistent with acute pancreatitis likely secondary to alcohol. NPO  IV fluids  Pain Control  Repeat Lipase, Amylase in AM.  Stop antibiotics as there is no evidence of infection  We will re-evaluate tomorrow. Thank you for consultation.     Halle Velasco MD

## 2019-07-20 LAB
ANION GAP SERPL CALC-SCNC: 7 MMOL/L (ref 5–15)
BUN SERPL-MCNC: 8 MG/DL (ref 6–20)
BUN/CREAT SERPL: 11 (ref 12–20)
CALCIUM SERPL-MCNC: 8.4 MG/DL (ref 8.5–10.1)
CHLORIDE SERPL-SCNC: 103 MMOL/L (ref 97–108)
CO2 SERPL-SCNC: 25 MMOL/L (ref 21–32)
CREAT SERPL-MCNC: 0.76 MG/DL (ref 0.7–1.3)
ERYTHROCYTE [DISTWIDTH] IN BLOOD BY AUTOMATED COUNT: 12.4 % (ref 11.5–14.5)
GLUCOSE SERPL-MCNC: 87 MG/DL (ref 65–100)
HCT VFR BLD AUTO: 45.2 % (ref 36.6–50.3)
HGB BLD-MCNC: 15.1 G/DL (ref 12.1–17)
LIPASE SERPL-CCNC: >3000 U/L (ref 73–393)
MAGNESIUM SERPL-MCNC: 1.9 MG/DL (ref 1.6–2.4)
MCH RBC QN AUTO: 30.2 PG (ref 26–34)
MCHC RBC AUTO-ENTMCNC: 33.4 G/DL (ref 30–36.5)
MCV RBC AUTO: 90.4 FL (ref 80–99)
NRBC # BLD: 0 K/UL (ref 0–0.01)
NRBC BLD-RTO: 0 PER 100 WBC
PHOSPHATE SERPL-MCNC: 2.3 MG/DL (ref 2.6–4.7)
PLATELET # BLD AUTO: 147 K/UL (ref 150–400)
PMV BLD AUTO: 11.6 FL (ref 8.9–12.9)
POTASSIUM SERPL-SCNC: 4.1 MMOL/L (ref 3.5–5.1)
RBC # BLD AUTO: 5 M/UL (ref 4.1–5.7)
SODIUM SERPL-SCNC: 135 MMOL/L (ref 136–145)
WBC # BLD AUTO: 19.4 K/UL (ref 4.1–11.1)

## 2019-07-20 PROCEDURE — 74011250636 HC RX REV CODE- 250/636: Performed by: HOSPITALIST

## 2019-07-20 PROCEDURE — 83735 ASSAY OF MAGNESIUM: CPT

## 2019-07-20 PROCEDURE — 74011000258 HC RX REV CODE- 258: Performed by: HOSPITALIST

## 2019-07-20 PROCEDURE — 65270000029 HC RM PRIVATE

## 2019-07-20 PROCEDURE — 36415 COLL VENOUS BLD VENIPUNCTURE: CPT

## 2019-07-20 PROCEDURE — 85027 COMPLETE CBC AUTOMATED: CPT

## 2019-07-20 PROCEDURE — 80048 BASIC METABOLIC PNL TOTAL CA: CPT

## 2019-07-20 PROCEDURE — 83690 ASSAY OF LIPASE: CPT

## 2019-07-20 PROCEDURE — 74011250637 HC RX REV CODE- 250/637: Performed by: HOSPITALIST

## 2019-07-20 PROCEDURE — 84100 ASSAY OF PHOSPHORUS: CPT

## 2019-07-20 PROCEDURE — 74011000250 HC RX REV CODE- 250: Performed by: HOSPITALIST

## 2019-07-20 RX ADMIN — HYDROMORPHONE HYDROCHLORIDE 1 MG: 1 INJECTION, SOLUTION INTRAMUSCULAR; INTRAVENOUS; SUBCUTANEOUS at 16:44

## 2019-07-20 RX ADMIN — ENOXAPARIN SODIUM 40 MG: 40 INJECTION SUBCUTANEOUS at 08:50

## 2019-07-20 RX ADMIN — HYDROMORPHONE HYDROCHLORIDE 1 MG: 1 INJECTION, SOLUTION INTRAMUSCULAR; INTRAVENOUS; SUBCUTANEOUS at 13:04

## 2019-07-20 RX ADMIN — OXYCODONE HYDROCHLORIDE 5 MG: 5 TABLET ORAL at 18:28

## 2019-07-20 RX ADMIN — HYDROMORPHONE HYDROCHLORIDE 1 MG: 1 INJECTION, SOLUTION INTRAMUSCULAR; INTRAVENOUS; SUBCUTANEOUS at 07:30

## 2019-07-20 RX ADMIN — ONDANSETRON 4 MG: 2 INJECTION INTRAMUSCULAR; INTRAVENOUS at 05:36

## 2019-07-20 RX ADMIN — Medication 10 ML: at 05:36

## 2019-07-20 RX ADMIN — Medication 10 ML: at 01:27

## 2019-07-20 RX ADMIN — ACETAMINOPHEN 650 MG: 325 TABLET ORAL at 20:57

## 2019-07-20 RX ADMIN — OXYCODONE HYDROCHLORIDE 5 MG: 5 TABLET ORAL at 05:35

## 2019-07-20 RX ADMIN — Medication 10 ML: at 22:37

## 2019-07-20 RX ADMIN — OXYCODONE HYDROCHLORIDE 5 MG: 5 TABLET ORAL at 22:37

## 2019-07-20 RX ADMIN — ACETAMINOPHEN 650 MG: 325 TABLET ORAL at 05:35

## 2019-07-20 RX ADMIN — OXYCODONE HYDROCHLORIDE 5 MG: 5 TABLET ORAL at 14:20

## 2019-07-20 RX ADMIN — HYDROMORPHONE HYDROCHLORIDE 1 MG: 1 INJECTION, SOLUTION INTRAMUSCULAR; INTRAVENOUS; SUBCUTANEOUS at 01:27

## 2019-07-20 RX ADMIN — ONDANSETRON 4 MG: 2 INJECTION INTRAMUSCULAR; INTRAVENOUS at 20:34

## 2019-07-20 RX ADMIN — HYDROMORPHONE HYDROCHLORIDE 1 MG: 1 INJECTION, SOLUTION INTRAMUSCULAR; INTRAVENOUS; SUBCUTANEOUS at 20:34

## 2019-07-20 RX ADMIN — ONDANSETRON 4 MG: 2 INJECTION INTRAMUSCULAR; INTRAVENOUS at 16:44

## 2019-07-20 RX ADMIN — ONDANSETRON 4 MG: 2 INJECTION INTRAMUSCULAR; INTRAVENOUS at 01:27

## 2019-07-20 RX ADMIN — FOLIC ACID: 5 INJECTION, SOLUTION INTRAMUSCULAR; INTRAVENOUS; SUBCUTANEOUS at 13:01

## 2019-07-20 NOTE — PROGRESS NOTES
Bedside and Verbal shift change report given to Vivian Merida RN (oncoming nurse) by Riya Mena RN (offgoing nurse). Report included the following information SBAR, Kardex, Intake/Output, MAR and Recent Results.

## 2019-07-20 NOTE — PROGRESS NOTES
Bedside and Verbal shift change report given to RADHA Espana (oncoming nurse) by Rosalia Rose RN (offgoing nurse). Report included the following information SBAR, Kardex, ED Summary, Procedure Summary, Intake/Output, MAR and Recent Results.

## 2019-07-20 NOTE — PROGRESS NOTES
[address]  174 [facility], [address] [place] [address]                GI PROGRESS NOTE        NAME:   Cheng Simmons       :    1985       MRN:    [id number]     Assessment/Plan   1. Acute Pancreatitis: likely secondary to alcohol. CT revealed necrosis of head and body of pancreas. Lipase still more than 3000. Continue NPO, IV fluids, Pain control with narcotics. Repeat labs in AM.     Patient Active Problem List   Diagnosis Code    Acute pancreatitis K85.90       Subjective:     Cheng Simmons is a 29 y.o.  male who was admitted with abdominal pain    Review of Systems    Constitutional: negative fever, negative chills, negative weight loss  Eyes:   negative visual changes  ENT:   negative sore throat, tongue or lip swelling  Respiratory:  negative cough, negative dyspnea  Cards:  negative for chest pain, palpitations, lower extremity edema  GI:   See HPI  :  negative for frequency, dysuria  Integument:  negative for rash and pruritus  Heme:  negative for easy bruising and gum/nose bleeding  Musculoskel: negative for myalgias,  back pain and muscle weakness  Neuro: negative for headaches, dizziness, vertigo  Psych:  negative for feelings of anxiety, depression           Objective:     VITALS:   Last 24hrs VS reviewed since prior hospitalist progress note. Most recent are:  Visit Vitals  BP (!) 158/92 (BP 1 Location: Left arm, BP Patient Position: At rest)   Pulse 92   Temp 98 °F (36.7 °C)   Resp 16   Ht 5' 9\" (1.753 m)   Wt 73.5 kg (162 lb)   SpO2 98%   BMI 23.92 kg/m²     No intake or output data in the 24 hours ending 19 1255     PHYSICAL EXAM:  General   well developed, well nourished, appears stated age, in no acute distress  EENT  Normocephalic, Atraumatic, PERRLA, EOMI, sclera clear, nares clear, pharynx normal  Neck   Supple without nodes or mass.  No thyromegaly or bruit  Respiratory   Clear To Auscultation bilaterally - no wheezes, rales, rhonchi, or crackles  Cardiology  Regular Rate and Rythmn  - no murmurs, rubs or gallops  Abdominal  Soft, tenderness present epigastrium, non-distended, positive bowel sounds, no hepatosplenomegaly, no palpable mass  Extremities  No clubbing, cyanosis, or edema. Pulses intact. Back  No spinal or muscle pain. No CVAT. Skin  Normal skin turgor. No rashes or skin ulcers noted  Neurological  No focal neurological deficits noted  Psychological  Oriented x 3. Normal affect. Lab Data   Recent Results (from the past 12 hour(s))   CBC W/O DIFF    Collection Time: 07/20/19  5:46 AM   Result Value Ref Range    WBC 19.4 (H) 4.1 - 11.1 K/uL    RBC 5.00 4. 10 - 5.70 M/uL    HGB 15.1 12.1 - 17.0 g/dL    HCT 45.2 36.6 - 50.3 %    MCV 90.4 80.0 - 99.0 FL    MCH 30.2 26.0 - 34.0 PG    MCHC 33.4 30.0 - 36.5 g/dL    RDW 12.4 11.5 - 14.5 %    PLATELET 098 (L) 427 - 400 K/uL    MPV 11.6 8.9 - 12.9 FL    NRBC 0.0 0  WBC    ABSOLUTE NRBC 0.00 0.00 - 0.62 K/uL   METABOLIC PANEL, BASIC    Collection Time: 07/20/19  5:46 AM   Result Value Ref Range    Sodium 135 (L) 136 - 145 mmol/L    Potassium 4.1 3.5 - 5.1 mmol/L    Chloride 103 97 - 108 mmol/L    CO2 25 21 - 32 mmol/L    Anion gap 7 5 - 15 mmol/L    Glucose 87 65 - 100 mg/dL    BUN 8 6 - 20 MG/DL    Creatinine 0.76 0.70 - 1.30 MG/DL    BUN/Creatinine ratio 11 (L) 12 - 20      GFR est AA >60 >60 ml/min/1.73m2    GFR est non-AA >60 >60 ml/min/1.73m2    Calcium 8.4 (L) 8.5 - 10.1 MG/DL   MAGNESIUM    Collection Time: 07/20/19  5:46 AM   Result Value Ref Range    Magnesium 1.9 1.6 - 2.4 mg/dL   PHOSPHORUS    Collection Time: 07/20/19  5:46 AM   Result Value Ref Range    Phosphorus 2.3 (L) 2.6 - 4.7 MG/DL   LIPASE    Collection Time: 07/20/19  5:46 AM   Result Value Ref Range    Lipase >3,000 (H) 73 - 393 U/L         Medications Reviewed    PMH/ reviewed - no change compared to H&P  Attending Physician: Donald Nunez MD   Date/Time: 7/20/2019    ________________________________________________________________________

## 2019-07-20 NOTE — PROGRESS NOTES
Hospitalist Progress Note  Jenna Zhao MD  Answering service: [phone] from in house phone  Cell: [phone]      Date of Service:  2019  NAME:  Sudheer Garcia  :  1985  MRN:  [id number]      Admission Summary:     A 29 y.o man presents with abdominal pain. Symptoms began on the night of 19 after eating dinner and drinking a beer. The pain is sharp in the epigastrium and LUQ. It was constant with intermittent flares, non-radiating. Symptoms are exacerbated by moving and eating, no known alleviating factors, associated symptoms include nausea, vomiting, no diarrhea, constipation, bleeding, or fever. The symptoms progressively worsened prompting him to come to the ED. He denies similar symptoms in the past. He denies heavy alcohol use, states he drinks about 3 times a week and denies binge drinking. Interval history / Subjective:     Abdominal pain was 6/10 this morning, improving after dilaudid,      Assessment & Plan:     Acute pancreatitis   -NPO, continue lactated ringer and prn iv dilaudid   - iv cefepime and metronidazole discontinued on   -repeated lipase >3000 ,   -TG normal  -CT abdomen pelvis pancreatitis. Possible developing necrosis of the head and body. No drainable fluid collection. Small volume of ascites. Transverse colon and distal colon colitis versus under distention. Recommendation: CT pancreas in 4-6 weeks to reassess  -US of abdomen acute pancreatitis, better appreciated on the comparison CT, normal gallbladder and CBD.    -add IVF with folic acid, thiamin, mvi and vitamin k  -GI on board    Leukocytosis likely due to acute pancreatitis  -afebrile, trending up    Elevated BP no hx of HTN  -monitor BP        Code status: Full Code   DVT prophylaxis: lovenox    Care Plan discussed with: Patient/Family and Nurse  Disposition: TBD     Hospital Problems  Never Reviewed          Codes Class Noted POA    * (Principal) Acute pancreatitis ICD-10-CM: K85.90  ICD-9-CM: 195.8  7/19/2019 Unknown              Vital Signs:    Last 24hrs VS reviewed since prior progress note. Most recent are:  Visit Vitals  /90 (BP 1 Location: Left arm, BP Patient Position: At rest)   Pulse 85   Temp 98.4 °F (36.9 °C)   Resp 16   Ht 5' 9\" (1.753 m)   Wt 73.5 kg (162 lb)   SpO2 99%   BMI 23.92 kg/m²       No intake or output data in the 24 hours ending 07/20/19 0804     Physical Examination:             Constitutional:  No acute distress, cooperative, pleasant    ENT:  Oral mucous moist, oropharynx benign. Neck supple,    Resp:  CTA bilaterally. No wheezing/rhonchi/rales. No accessory muscle use   CV:  Regular rhythm, normal rate, no murmurs, gallops, rubs    GI:  Soft, non distended, non tender. normoactive bowel sounds, no hepatosplenomegaly     Musculoskeletal:  No edema    Neurologic:  Moves all extremities. AAOx3, CN II-XII reviewed     Skin:  Good turgor, no rashes or ulcers       Data Review:    Review and/or order of clinical lab test  Review and/or order of tests in the radiology section of CPT  Review and/or order of tests in the medicine section of CPT      Labs:     Recent Labs     07/20/19  0546 07/18/19 2213   WBC 19.4* 18.6*   HGB 15.1 16.7   HCT 45.2 49.1   * 221     Recent Labs     07/20/19  0546 07/18/19 2213   * 137   K 4.1 4.4    102   CO2 25 25   BUN 8 15   CREA 0.76 1.07   GLU 87 157*   CA 8.4* 9.8   MG 1.9 1.9   PHOS 2.3* 3.4     Recent Labs     07/20/19  0546 07/18/19 2213   SGOT  --  19   ALT  --  27   AP  --  89   TBILI  --  0.9   TP  --  7.8   ALB  --  4.4   GLOB  --  3.4   LPSE >3,000* >3,000*     No results for input(s): INR, PTP, APTT in the last 72 hours. No lab exists for component: INREXT, INREXT   No results for input(s): FE, TIBC, PSAT, FERR in the last 72 hours.    No results found for: FOL, RBCF   No results for input(s): PH, PCO2, PO2 in the last 72 hours. No results for input(s): CPK, CKNDX, TROIQ in the last 72 hours.     No lab exists for component: CPKMB  Lab Results   Component Value Date/Time    Cholesterol, total 175 07/18/2019 10:13 PM    HDL Cholesterol 66 07/18/2019 10:13 PM    LDL, calculated 101.8 (H) 07/18/2019 10:13 PM    Triglyceride 36 07/18/2019 10:13 PM    CHOL/HDL Ratio 2.7 07/18/2019 10:13 PM     No results found for: GLUCPOC  Lab Results   Component Value Date/Time    Color DARK YELLOW 07/18/2019 10:13 PM    Appearance CLEAR 07/18/2019 10:13 PM    Specific gravity >1.030 (H) 07/18/2019 10:13 PM    pH (UA) 6.0 07/18/2019 10:13 PM    Protein 100 (A) 07/18/2019 10:13 PM    Glucose 100 (A) 07/18/2019 10:13 PM    Ketone NEGATIVE  07/18/2019 10:13 PM    Bilirubin NEGATIVE  07/18/2019 10:13 PM    Urobilinogen 0.2 07/18/2019 10:13 PM    Nitrites NEGATIVE  07/18/2019 10:13 PM    Leukocyte Esterase NEGATIVE  07/18/2019 10:13 PM    Epithelial cells FEW 07/18/2019 10:13 PM    Bacteria NEGATIVE  07/18/2019 10:13 PM    WBC 0-4 07/18/2019 10:13 PM    RBC 5-10 07/18/2019 10:13 PM         Medications Reviewed:     Current Facility-Administered Medications   Medication Dose Route Frequency    sodium chloride (NS) flush 5-40 mL  5-40 mL IntraVENous Q8H    sodium chloride (NS) flush 5-40 mL  5-40 mL IntraVENous PRN    acetaminophen (TYLENOL) tablet 650 mg  650 mg Oral Q4H PRN    HYDROmorphone (PF) (DILAUDID) injection 1 mg  1 mg IntraVENous Q4H PRN    ondansetron (ZOFRAN) injection 4 mg  4 mg IntraVENous Q4H PRN    enoxaparin (LOVENOX) injection 40 mg  40 mg SubCUTAneous Q24H    lactated Ringers infusion  200 mL/hr IntraVENous CONTINUOUS    oxyCODONE IR (ROXICODONE) tablet 5 mg  5 mg Oral Q4H PRN     ______________________________________________________________________  EXPECTED LENGTH OF STAY: 3d 4h  ACTUAL LENGTH OF STAY:          1                 Filipe Davey MD

## 2019-07-20 NOTE — PROGRESS NOTES
Bedside and Verbal shift change report given to Mel Cotto (oncoming nurse) by Monika Liu (offgoing nurse). Report included the following information SBAR, Kardex and MAR.

## 2019-07-21 ENCOUNTER — APPOINTMENT (OUTPATIENT)
Dept: CT IMAGING | Age: 34
DRG: 439 | End: 2019-07-21
Attending: HOSPITALIST
Payer: COMMERCIAL

## 2019-07-21 LAB
ALBUMIN SERPL-MCNC: 2.9 G/DL (ref 3.5–5)
ALBUMIN/GLOB SERPL: 0.9 {RATIO} (ref 1.1–2.2)
ALP SERPL-CCNC: 74 U/L (ref 45–117)
ALT SERPL-CCNC: 16 U/L (ref 12–78)
ANION GAP SERPL CALC-SCNC: 8 MMOL/L (ref 5–15)
AST SERPL-CCNC: 18 U/L (ref 15–37)
BILIRUB SERPL-MCNC: 0.9 MG/DL (ref 0.2–1)
BUN SERPL-MCNC: 7 MG/DL (ref 6–20)
BUN/CREAT SERPL: 10 (ref 12–20)
CALCIUM SERPL-MCNC: 8.4 MG/DL (ref 8.5–10.1)
CHLORIDE SERPL-SCNC: 101 MMOL/L (ref 97–108)
CO2 SERPL-SCNC: 24 MMOL/L (ref 21–32)
CREAT SERPL-MCNC: 0.7 MG/DL (ref 0.7–1.3)
ERYTHROCYTE [DISTWIDTH] IN BLOOD BY AUTOMATED COUNT: 12.4 % (ref 11.5–14.5)
GLOBULIN SER CALC-MCNC: 3.4 G/DL (ref 2–4)
GLUCOSE SERPL-MCNC: 103 MG/DL (ref 65–100)
HCT VFR BLD AUTO: 42.2 % (ref 36.6–50.3)
HGB BLD-MCNC: 14.3 G/DL (ref 12.1–17)
LIPASE SERPL-CCNC: 1123 U/L (ref 73–393)
MCH RBC QN AUTO: 30.6 PG (ref 26–34)
MCHC RBC AUTO-ENTMCNC: 33.9 G/DL (ref 30–36.5)
MCV RBC AUTO: 90.4 FL (ref 80–99)
NRBC # BLD: 0 K/UL (ref 0–0.01)
NRBC BLD-RTO: 0 PER 100 WBC
PLATELET # BLD AUTO: 161 K/UL (ref 150–400)
PMV BLD AUTO: 11.8 FL (ref 8.9–12.9)
POTASSIUM SERPL-SCNC: 3.8 MMOL/L (ref 3.5–5.1)
PROT SERPL-MCNC: 6.3 G/DL (ref 6.4–8.2)
RBC # BLD AUTO: 4.67 M/UL (ref 4.1–5.7)
SODIUM SERPL-SCNC: 133 MMOL/L (ref 136–145)
WBC # BLD AUTO: 18.4 K/UL (ref 4.1–11.1)

## 2019-07-21 PROCEDURE — 74011250637 HC RX REV CODE- 250/637: Performed by: HOSPITALIST

## 2019-07-21 PROCEDURE — 74011250636 HC RX REV CODE- 250/636: Performed by: HOSPITALIST

## 2019-07-21 PROCEDURE — 74011000250 HC RX REV CODE- 250: Performed by: HOSPITALIST

## 2019-07-21 PROCEDURE — 36415 COLL VENOUS BLD VENIPUNCTURE: CPT

## 2019-07-21 PROCEDURE — 74011000258 HC RX REV CODE- 258: Performed by: HOSPITALIST

## 2019-07-21 PROCEDURE — 80053 COMPREHEN METABOLIC PANEL: CPT

## 2019-07-21 PROCEDURE — 74011250636 HC RX REV CODE- 250/636: Performed by: SPECIALIST

## 2019-07-21 PROCEDURE — 83690 ASSAY OF LIPASE: CPT

## 2019-07-21 PROCEDURE — 65270000029 HC RM PRIVATE

## 2019-07-21 PROCEDURE — 74177 CT ABD & PELVIS W/CONTRAST: CPT

## 2019-07-21 PROCEDURE — 85027 COMPLETE CBC AUTOMATED: CPT

## 2019-07-21 RX ORDER — SODIUM CHLORIDE, SODIUM LACTATE, POTASSIUM CHLORIDE, CALCIUM CHLORIDE 600; 310; 30; 20 MG/100ML; MG/100ML; MG/100ML; MG/100ML
150 INJECTION, SOLUTION INTRAVENOUS CONTINUOUS
Status: DISCONTINUED | OUTPATIENT
Start: 2019-07-21 | End: 2019-07-22

## 2019-07-21 RX ORDER — SODIUM CHLORIDE 0.9 % (FLUSH) 0.9 %
10 SYRINGE (ML) INJECTION
Status: COMPLETED | OUTPATIENT
Start: 2019-07-21 | End: 2019-07-22

## 2019-07-21 RX ADMIN — Medication 10 ML: at 06:00

## 2019-07-21 RX ADMIN — ONDANSETRON 4 MG: 2 INJECTION INTRAMUSCULAR; INTRAVENOUS at 22:58

## 2019-07-21 RX ADMIN — OXYCODONE HYDROCHLORIDE 5 MG: 5 TABLET ORAL at 08:20

## 2019-07-21 RX ADMIN — ONDANSETRON 4 MG: 2 INJECTION INTRAMUSCULAR; INTRAVENOUS at 04:47

## 2019-07-21 RX ADMIN — FOLIC ACID: 5 INJECTION, SOLUTION INTRAMUSCULAR; INTRAVENOUS; SUBCUTANEOUS at 08:20

## 2019-07-21 RX ADMIN — ACETAMINOPHEN 650 MG: 325 TABLET ORAL at 10:07

## 2019-07-21 RX ADMIN — ONDANSETRON 4 MG: 2 INJECTION INTRAMUSCULAR; INTRAVENOUS at 00:36

## 2019-07-21 RX ADMIN — HYDROMORPHONE HYDROCHLORIDE 1 MG: 1 INJECTION, SOLUTION INTRAMUSCULAR; INTRAVENOUS; SUBCUTANEOUS at 04:47

## 2019-07-21 RX ADMIN — OXYCODONE HYDROCHLORIDE 5 MG: 5 TABLET ORAL at 15:06

## 2019-07-21 RX ADMIN — Medication 10 ML: at 22:58

## 2019-07-21 RX ADMIN — HYDROMORPHONE HYDROCHLORIDE 1 MG: 1 INJECTION, SOLUTION INTRAMUSCULAR; INTRAVENOUS; SUBCUTANEOUS at 10:07

## 2019-07-21 RX ADMIN — ENOXAPARIN SODIUM 40 MG: 40 INJECTION SUBCUTANEOUS at 09:47

## 2019-07-21 RX ADMIN — HYDROMORPHONE HYDROCHLORIDE 1 MG: 1 INJECTION, SOLUTION INTRAMUSCULAR; INTRAVENOUS; SUBCUTANEOUS at 00:37

## 2019-07-21 RX ADMIN — OXYCODONE HYDROCHLORIDE 5 MG: 5 TABLET ORAL at 19:03

## 2019-07-21 RX ADMIN — SODIUM CHLORIDE, SODIUM LACTATE, POTASSIUM CHLORIDE, AND CALCIUM CHLORIDE 150 ML/HR: 600; 310; 30; 20 INJECTION, SOLUTION INTRAVENOUS at 18:29

## 2019-07-21 RX ADMIN — OXYCODONE HYDROCHLORIDE 5 MG: 5 TABLET ORAL at 22:58

## 2019-07-21 RX ADMIN — ONDANSETRON 4 MG: 2 INJECTION INTRAMUSCULAR; INTRAVENOUS at 10:07

## 2019-07-21 RX ADMIN — ONDANSETRON 4 MG: 2 INJECTION INTRAMUSCULAR; INTRAVENOUS at 19:03

## 2019-07-21 NOTE — PROGRESS NOTES
[address], [address] [place] [address]                GI PROGRESS NOTE        NAME:   Sara Lyon       :    1985       MRN:    [id number]     Assessment/Plan   1. Acute Pancreatitis: likely secondary to alcohol. Pain much improved. Lipase back to normal. Will advance to cardiac diet. If he tolerates diet, he can be discharged home with outpatient FU at my office in 2-3 weeks. Patient Active Problem List   Diagnosis Code    Acute pancreatitis K85.90       Subjective:     Sara Lyon is a 29 y.o.  male who was admitted with abdominal pain    Review of Systems    Constitutional: negative fever, negative chills, negative weight loss  Eyes:   negative visual changes  ENT:   negative sore throat, tongue or lip swelling  Respiratory:  negative cough, negative dyspnea  Cards:  negative for chest pain, palpitations, lower extremity edema  GI:   See HPI  :  negative for frequency, dysuria  Integument:  negative for rash and pruritus  Heme:  negative for easy bruising and gum/nose bleeding  Musculoskel: negative for myalgias,  back pain and muscle weakness  Neuro: negative for headaches, dizziness, vertigo  Psych:  negative for feelings of anxiety, depression           Objective:     VITALS:   Last 24hrs VS reviewed since prior hospitalist progress note. Most recent are:  Visit Vitals  BP (!) 157/98 (BP 1 Location: Left arm, BP Patient Position: At rest)   Pulse 99   Temp 99.3 °F (37.4 °C)   Resp 16   Ht 5' 9\" (1.753 m)   Wt 73.5 kg (162 lb)   SpO2 96%   BMI 23.92 kg/m²     No intake or output data in the 24 hours ending 19 3282     PHYSICAL EXAM:  General   well developed, well nourished, appears stated age, in no acute distress  EENT  Normocephalic, Atraumatic, PERRLA, EOMI, sclera clear, nares clear, pharynx normal  Neck   Supple without nodes or mass.  No thyromegaly or bruit  Respiratory   Clear To Auscultation bilaterally - no wheezes, rales, rhonchi, or crackles  Cardiology  Regular Rate and Rythmn  - no murmurs, rubs or gallops  Abdominal  Soft, non tender, non distended, positive bowel sounds, no hepatosplenomegaly, no palpable mass  Extremities  No clubbing, cyanosis, or edema. Pulses intact. Back  No spinal or muscle pain. No CVAT. Skin  Normal skin turgor. No rashes or skin ulcers noted  Neurological  No focal neurological deficits noted  Psychological  Oriented x 3. Normal affect.        Lab Data :reviewed      Medications Reviewed    PMH/ reviewed - no change compared to H&P  Attending Physician: Barbara Foote MD   Date/Time:  7/21/2019    ________________________________________________________________________

## 2019-07-21 NOTE — PROGRESS NOTES
Bedside and Verbal shift change report given to Behzad Cisneros (oncoming nurse) by Mary Alice Orozco (offgoing nurse). Report included the following information SBAR.

## 2019-07-21 NOTE — PROGRESS NOTES
Bedside and Verbal shift change report given to Clark Laboy RN (oncoming nurse) by Brenna Pickering RN  (offgoing nurse). Report included the following information SBAR, ED Summary, Procedure Summary, Intake/Output, MAR and Recent Results.

## 2019-07-21 NOTE — PROGRESS NOTES
Visit Vitals  BP (!) 157/98 (BP 1 Location: Left arm, BP Patient Position: At rest)   Pulse 99   Temp (!) 100.8 °F (38.2 °C)   Resp 16   Ht 5' 9\" (1.753 m)   Wt 73.5 kg (162 lb)   SpO2 96%   BMI 23.92 kg/m²     Dr. Daily De Los Santos notified of BP and temperature. MD will assess patient.

## 2019-07-21 NOTE — PROGRESS NOTES
Hospitalist Progress Note  Misti Wetzel MD  Answering service: [phone] from in house phone  Cell: [phone]      Date of Service:  2019  NAME:  Kingsley Padilla  :  1985  MRN:  [id number]      Admission Summary:     A 29 y.o man presents with abdominal pain. Symptoms began on the night of 19 after eating dinner and drinking a beer. The pain is sharp in the epigastrium and LUQ. It was constant with intermittent flares, non-radiating. Symptoms are exacerbated by moving and eating, no known alleviating factors, associated symptoms include nausea, vomiting, no diarrhea, constipation, bleeding, or fever. The symptoms progressively worsened prompting him to come to the ED. He denies similar symptoms in the past. He denies heavy alcohol use, states he drinks about 3 times a week and denies binge drinking. Interval history / Subjective:     Nash fever 100.8, abdominal pain was 4/10 this morning, no vomiting, has not pass marlene     Assessment & Plan:     Acute pancreatitis   -NPO, continue lactated ringer and prn iv dilaudid   -off iv cefepime and metronidazole on   -repeated lipase improving to 1,123  From >3000 ,   -TG normal  -CT abdomen pelvis pancreatitis. Possible developing necrosis of the head and body. No drainable fluid collection. Small volume of ascites. Transverse colon and distal colon colitis versus under distention. Recommendation: CT pancreas in 4-6 weeks to reassess  -US of abdomen acute pancreatitis, better appreciated on the comparison CT, normal gallbladder and CBD.    -continue IVF with folic acid, thiamin, mvi and vitamin k  -GI on board    Leukocytosis likely due to acute pancreatitis  -spike fevere, wbc 18.4, repeat CT abd pelvis on     Elevated BP no hx of HTN  -monitor BP        Code status: Full Code   DVT prophylaxis: lovenox    Care Plan discussed with: Patient/Family and Nurse  Disposition: TBD     Hospital Problems  Never Reviewed          Codes Class Noted POA    * (Principal) Acute pancreatitis ICD-10-CM: K85.90  ICD-9-CM: 934.4  7/19/2019 Unknown              Vital Signs:    Last 24hrs VS reviewed since prior progress note. Most recent are:  Visit Vitals  BP (!) 157/98 (BP 1 Location: Left arm, BP Patient Position: At rest)   Pulse 99   Temp (!) 100.8 °F (38.2 °C)   Resp 16   Ht 5' 9\" (1.753 m)   Wt 73.5 kg (162 lb)   SpO2 96%   BMI 23.92 kg/m²       No intake or output data in the 24 hours ending 07/21/19 1010     Physical Examination:             Constitutional:  No acute distress, cooperative, pleasant    ENT:  Oral mucous moist, oropharynx benign. Neck supple,    Resp:  CTA bilaterally. No wheezing/rhonchi/rales. No accessory muscle use   CV:  Regular rhythm, normal rate, no murmurs, gallops, rubs    GI:  Soft, not distended, slight tender. normoactive bowel sounds, no hepatosplenomegaly     Musculoskeletal:  No edema    Neurologic:  Moves all extremities. AAOx3, CN II-XII reviewed     Skin:  Good turgor, no rashes or ulcers       Data Review:    Review and/or order of clinical lab test  Review and/or order of tests in the radiology section of CPT  Review and/or order of tests in the medicine section of CPT      Labs:     Recent Labs     07/21/19 0044 07/20/19  0546   WBC 18.4* 19.4*   HGB 14.3 15.1   HCT 42.2 45.2    147*     Recent Labs     07/21/19 0044 07/20/19  0546 07/18/19 2213   * 135* 137   K 3.8 4.1 4.4    103 102   CO2 24 25 25   BUN 7 8 15   CREA 0.70 0.76 1.07   * 87 157*   CA 8.4* 8.4* 9.8   MG  --  1.9 1.9   PHOS  --  2.3* 3.4     Recent Labs     07/21/19  0044 07/20/19  0546 07/18/19  2213   SGOT 18  --  19   ALT 16  --  27   AP 74  --  89   TBILI 0.9  --  0.9   TP 6.3*  --  7.8   ALB 2.9*  --  4.4   GLOB 3.4  --  3.4   LPSE 1,123* >3,000* >3,000*     No results for input(s): INR, PTP, APTT in the last 72 hours.     No lab exists for component: INREXT, INREXT   No results for input(s): FE, TIBC, PSAT, FERR in the last 72 hours. No results found for: FOL, RBCF   No results for input(s): PH, PCO2, PO2 in the last 72 hours. No results for input(s): CPK, CKNDX, TROIQ in the last 72 hours. No lab exists for component: CPKMB  Lab Results   Component Value Date/Time    Cholesterol, total 175 07/18/2019 10:13 PM    HDL Cholesterol 66 07/18/2019 10:13 PM    LDL, calculated 101.8 (H) 07/18/2019 10:13 PM    Triglyceride 36 07/18/2019 10:13 PM    CHOL/HDL Ratio 2.7 07/18/2019 10:13 PM     No results found for: GLUCPOC  Lab Results   Component Value Date/Time    Color DARK YELLOW 07/18/2019 10:13 PM    Appearance CLEAR 07/18/2019 10:13 PM    Specific gravity >1.030 (H) 07/18/2019 10:13 PM    pH (UA) 6.0 07/18/2019 10:13 PM    Protein 100 (A) 07/18/2019 10:13 PM    Glucose 100 (A) 07/18/2019 10:13 PM    Ketone NEGATIVE  07/18/2019 10:13 PM    Bilirubin NEGATIVE  07/18/2019 10:13 PM    Urobilinogen 0.2 07/18/2019 10:13 PM    Nitrites NEGATIVE  07/18/2019 10:13 PM    Leukocyte Esterase NEGATIVE  07/18/2019 10:13 PM    Epithelial cells FEW 07/18/2019 10:13 PM    Bacteria NEGATIVE  07/18/2019 10:13 PM    WBC 0-4 07/18/2019 10:13 PM    RBC 5-10 07/18/2019 10:13 PM         Medications Reviewed:     Current Facility-Administered Medications   Medication Dose Route Frequency    dextrose 5 % - 0.45% NaCl 1,976 mL with folic acid 1 mg, thiamine 100 mg, mvi, adult no. 4 with vit K 10 mL infusion   IntraVENous DAILY    sodium chloride (NS) flush 5-40 mL  5-40 mL IntraVENous Q8H    sodium chloride (NS) flush 5-40 mL  5-40 mL IntraVENous PRN    acetaminophen (TYLENOL) tablet 650 mg  650 mg Oral Q4H PRN    HYDROmorphone (PF) (DILAUDID) injection 1 mg  1 mg IntraVENous Q4H PRN    ondansetron (ZOFRAN) injection 4 mg  4 mg IntraVENous Q4H PRN    enoxaparin (LOVENOX) injection 40 mg  40 mg SubCUTAneous Q24H    lactated Ringers infusion  200 mL/hr IntraVENous CONTINUOUS    oxyCODONE IR (ROXICODONE) tablet 5 mg  5 mg Oral Q4H PRN     ______________________________________________________________________  EXPECTED LENGTH OF STAY: 3d 4h  ACTUAL LENGTH OF STAY:          2                 Sunita Omer MD

## 2019-07-22 VITALS
HEART RATE: 90 BPM | TEMPERATURE: 98 F | WEIGHT: 162 LBS | HEIGHT: 69 IN | DIASTOLIC BLOOD PRESSURE: 97 MMHG | BODY MASS INDEX: 23.99 KG/M2 | OXYGEN SATURATION: 96 % | RESPIRATION RATE: 15 BRPM | SYSTOLIC BLOOD PRESSURE: 146 MMHG

## 2019-07-22 LAB
ALBUMIN SERPL-MCNC: 2.5 G/DL (ref 3.5–5)
ALBUMIN/GLOB SERPL: 0.7 {RATIO} (ref 1.1–2.2)
ALP SERPL-CCNC: 74 U/L (ref 45–117)
ALT SERPL-CCNC: 16 U/L (ref 12–78)
ANION GAP SERPL CALC-SCNC: 7 MMOL/L (ref 5–15)
AST SERPL-CCNC: 17 U/L (ref 15–37)
BILIRUB SERPL-MCNC: 1 MG/DL (ref 0.2–1)
BUN SERPL-MCNC: 8 MG/DL (ref 6–20)
BUN/CREAT SERPL: 11 (ref 12–20)
CALCIUM SERPL-MCNC: 8.3 MG/DL (ref 8.5–10.1)
CHLORIDE SERPL-SCNC: 102 MMOL/L (ref 97–108)
CO2 SERPL-SCNC: 24 MMOL/L (ref 21–32)
CREAT SERPL-MCNC: 0.72 MG/DL (ref 0.7–1.3)
ERYTHROCYTE [DISTWIDTH] IN BLOOD BY AUTOMATED COUNT: 12.1 % (ref 11.5–14.5)
GLOBULIN SER CALC-MCNC: 3.7 G/DL (ref 2–4)
GLUCOSE SERPL-MCNC: 92 MG/DL (ref 65–100)
HCT VFR BLD AUTO: 38.9 % (ref 36.6–50.3)
HGB BLD-MCNC: 13.3 G/DL (ref 12.1–17)
LIPASE SERPL-CCNC: 276 U/L (ref 73–393)
MCH RBC QN AUTO: 30.7 PG (ref 26–34)
MCHC RBC AUTO-ENTMCNC: 34.2 G/DL (ref 30–36.5)
MCV RBC AUTO: 89.8 FL (ref 80–99)
NRBC # BLD: 0 K/UL (ref 0–0.01)
NRBC BLD-RTO: 0 PER 100 WBC
PLATELET # BLD AUTO: 175 K/UL (ref 150–400)
PMV BLD AUTO: 10.9 FL (ref 8.9–12.9)
POTASSIUM SERPL-SCNC: 3.8 MMOL/L (ref 3.5–5.1)
PROT SERPL-MCNC: 6.2 G/DL (ref 6.4–8.2)
RBC # BLD AUTO: 4.33 M/UL (ref 4.1–5.7)
SODIUM SERPL-SCNC: 133 MMOL/L (ref 136–145)
WBC # BLD AUTO: 14.7 K/UL (ref 4.1–11.1)

## 2019-07-22 PROCEDURE — 74011636320 HC RX REV CODE- 636/320: Performed by: RADIOLOGY

## 2019-07-22 PROCEDURE — 80053 COMPREHEN METABOLIC PANEL: CPT

## 2019-07-22 PROCEDURE — 74011250636 HC RX REV CODE- 250/636: Performed by: SPECIALIST

## 2019-07-22 PROCEDURE — 74011250637 HC RX REV CODE- 250/637: Performed by: HOSPITALIST

## 2019-07-22 PROCEDURE — 74011250636 HC RX REV CODE- 250/636: Performed by: HOSPITALIST

## 2019-07-22 PROCEDURE — 83690 ASSAY OF LIPASE: CPT

## 2019-07-22 PROCEDURE — 36415 COLL VENOUS BLD VENIPUNCTURE: CPT

## 2019-07-22 PROCEDURE — 74011000258 HC RX REV CODE- 258: Performed by: RADIOLOGY

## 2019-07-22 PROCEDURE — 85027 COMPLETE CBC AUTOMATED: CPT

## 2019-07-22 RX ORDER — LANOLIN ALCOHOL/MO/W.PET/CERES
100 CREAM (GRAM) TOPICAL DAILY
Qty: 30 TAB | Refills: 0 | Status: ON HOLD | OUTPATIENT
Start: 2019-07-23 | End: 2020-05-09

## 2019-07-22 RX ORDER — FOLIC ACID 1 MG/1
1 TABLET ORAL DAILY
Qty: 30 TAB | Refills: 0 | Status: ON HOLD | OUTPATIENT
Start: 2019-07-23 | End: 2020-05-09

## 2019-07-22 RX ORDER — SODIUM CHLORIDE, SODIUM LACTATE, POTASSIUM CHLORIDE, CALCIUM CHLORIDE 600; 310; 30; 20 MG/100ML; MG/100ML; MG/100ML; MG/100ML
125 INJECTION, SOLUTION INTRAVENOUS CONTINUOUS
Status: DISCONTINUED | OUTPATIENT
Start: 2019-07-22 | End: 2019-07-22 | Stop reason: HOSPADM

## 2019-07-22 RX ORDER — OXYCODONE HYDROCHLORIDE 5 MG/1
5 TABLET ORAL
Qty: 6 TAB | Refills: 0 | Status: SHIPPED | OUTPATIENT
Start: 2019-07-22 | End: 2019-07-25

## 2019-07-22 RX ORDER — FOLIC ACID 1 MG/1
1 TABLET ORAL DAILY
Status: DISCONTINUED | OUTPATIENT
Start: 2019-07-22 | End: 2019-07-22 | Stop reason: HOSPADM

## 2019-07-22 RX ORDER — LANOLIN ALCOHOL/MO/W.PET/CERES
100 CREAM (GRAM) TOPICAL DAILY
Status: DISCONTINUED | OUTPATIENT
Start: 2019-07-22 | End: 2019-07-22 | Stop reason: HOSPADM

## 2019-07-22 RX ADMIN — OXYCODONE HYDROCHLORIDE 5 MG: 5 TABLET ORAL at 14:14

## 2019-07-22 RX ADMIN — Medication 10 ML: at 05:23

## 2019-07-22 RX ADMIN — ONDANSETRON 4 MG: 2 INJECTION INTRAMUSCULAR; INTRAVENOUS at 05:22

## 2019-07-22 RX ADMIN — SODIUM CHLORIDE 100 ML: 900 INJECTION, SOLUTION INTRAVENOUS at 03:00

## 2019-07-22 RX ADMIN — ONDANSETRON 4 MG: 2 INJECTION INTRAMUSCULAR; INTRAVENOUS at 14:15

## 2019-07-22 RX ADMIN — SODIUM CHLORIDE, SODIUM LACTATE, POTASSIUM CHLORIDE, AND CALCIUM CHLORIDE 125 ML/HR: 600; 310; 30; 20 INJECTION, SOLUTION INTRAVENOUS at 11:18

## 2019-07-22 RX ADMIN — OXYCODONE HYDROCHLORIDE 5 MG: 5 TABLET ORAL at 05:22

## 2019-07-22 RX ADMIN — Medication 100 MG: at 11:17

## 2019-07-22 RX ADMIN — IOPAMIDOL 100 ML: 755 INJECTION, SOLUTION INTRAVENOUS at 03:00

## 2019-07-22 RX ADMIN — SODIUM CHLORIDE, SODIUM LACTATE, POTASSIUM CHLORIDE, AND CALCIUM CHLORIDE 125 ML/HR: 600; 310; 30; 20 INJECTION, SOLUTION INTRAVENOUS at 18:24

## 2019-07-22 RX ADMIN — ENOXAPARIN SODIUM 40 MG: 40 INJECTION SUBCUTANEOUS at 08:50

## 2019-07-22 RX ADMIN — Medication 10 ML: at 03:00

## 2019-07-22 RX ADMIN — FOLIC ACID 1 MG: 1 TABLET ORAL at 11:17

## 2019-07-22 RX ADMIN — SODIUM CHLORIDE, SODIUM LACTATE, POTASSIUM CHLORIDE, AND CALCIUM CHLORIDE 150 ML/HR: 600; 310; 30; 20 INJECTION, SOLUTION INTRAVENOUS at 09:50

## 2019-07-22 NOTE — CDMP QUERY
Patient admitted with acute pancreatitis, noted to have drop in Na level. If possible, please document in progress notes and d/c summary if you are evaluating and/or treating any of the following:    => Mild Hyponatremia   => Other Explanation of clinical findings  => Clinically Undetermined (no explanation for clinical findings)    The medical record reflects the following:       Risk Factors: acute pancreatitis     Clinical Indicators: Pt's Na levels decreased to 133 x2 (07/21 & 07/22)     Treatment: LR cont infusion, lab monitoring    Please clarify and document your clinical opinion in the progress notes and discharge summary including the definitive and/or presumptive diagnosis, (suspected or probable), related to the above clinical findings. Please include clinical findings supporting your diagnosis. REFERENCE:  Clinical research has shown that hyponatremia is strongly associated with an increased risk of mortality (ROM), even mild hyponatremia (serum Na levels 130-134 mEq/L) is associated with a 47% increase of inpatient mortality.     Thank you,     Skip Jenkins, RN, MSN, CCRN,CDMP  [phone]

## 2019-07-22 NOTE — PROGRESS NOTES
Bedside and Verbal shift change report given to Geoffry Shone, RN (oncoming nurse) by Angle Gerard RN (offgoing nurse). Report included the following information SBAR, Kardex, ED Summary, Procedure Summary, Intake/Output, MAR and Recent Results.

## 2019-07-22 NOTE — DISCHARGE SUMMARY
Discharge Summary       PATIENT ID: Dania Brar  MRN: [id number]   YOB: 1985    DATE OF ADMISSION: 7/18/2019  9:09 PM    DATE OF DISCHARGE: 7/22/2019   PRIMARY CARE PROVIDER: None     ATTENDING PHYSICIAN: Ariane Santacruz MD  DISCHARGING PROVIDER: Omar Myles MD    To contact this individual call [phone] and ask the  to page. If unavailable ask to be transferred the Adult Hospitalist Department. CONSULTATIONS: IP CONSULT TO GASTROENTEROLOGY    PROCEDURES/SURGERIES: * No surgery found *    ADMITTING DIAGNOSES & HOSPITAL COURSE:     A 29 y.o man presents with abdominal pain. Symptoms began on the night of 7/17/19 after eating dinner and drinking a beer. The pain is sharp in the epigastrium and LUQ. It was constant with intermittent flares, non-radiating. Symptoms are exacerbated by moving and eating, no known alleviating factors, associated symptoms include nausea, vomiting, no diarrhea, constipation, bleeding, or fever. The symptoms progressively worsened prompting him to come to the ED. He denies similar symptoms in the past. He denies heavy alcohol use, states he drinks about 3 times a week and denies binge drinking.     Acute pancreatitis   -improved with lactated ringer and prn iv dilaudid   -off iv cefepime and metronidazole on 7/19  -repeated lipase normal 276 7/22, it was >3000 7/20,   -TG normal  -CT abdomen pelvis pancreatitis. Possible developing necrosis of the head and body. No drainable fluid collection. Small volume of ascites. Transverse colon and distal colon colitis versus under distention. Recommendation: CT pancreas in 4-6 weeks to reassess  -US of abdomen acute pancreatitis, better appreciated on the comparison CT, normal gallbladder and CBD.    -on folic acid, thiamin, mvi and vitamin k  -advanced diet and tolerated  -GI on board  Leukocytosis likely due to acute pancreatitis  -spike fevere, wbc improving to 14.7 from 18.6,   -repeated CT abd pelvis on 7/21roughly stable changes of acute pancreatitis without new complication. Moderate abdominal and pelvic ascites, stable. new bilateral moderate pleural effusions and bibasilar atelectasis.   Elevated BP no hx of HTN  -BP improving, monitor BP  Mild hyponatremia   -multifactorial,         Code status: Full Code   DVT prophylaxis: lovenox    DISCHARGE DIAGNOSES / PLAN:      Acute pancreatitis   -resolved with IVF and prn pain meds  -tolerated diet  -outpatient follow up with GI  Leukocytosis likely due to acute pancreatitis  -improved   Elevated BP no hx of HTN  -improved  Mild hyponatremia   -monitor electrolyte     PENDING TEST RESULTS:   At the time of discharge the following test results are still pending: None     FOLLOW UP APPOINTMENTS:   Follow-up Information     Follow up With Specialties Details Why Contact Info   1. Primary Care Physician   In one week  None (395) Patient stated that they have no PCP       2. Rosina Ibarra MD, Gastroenterologist in 3 weeks      DIET: Cardiac Diet    ACTIVITY: Activity as tolerated    WOUND CARE: None    EQUIPMENT needed: None      DISCHARGE MEDICATIONS:  Current Discharge Medication List      START taking these medications    Details   folic acid (FOLVITE) 1 mg tablet Take 1 Tab by mouth daily. Qty: 30 Tab, Refills: 0      oxyCODONE IR (ROXICODONE) 5 mg immediate release tablet Take 1 Tab by mouth every eight (8) hours as needed for Pain for up to 3 days. Max Daily Amount: 15 mg.  Qty: 6 Tab, Refills: 0    Associated Diagnoses: Acute pancreatitis, unspecified complication status, unspecified pancreatitis type      thiamine HCL (B-1) 100 mg tablet Take 1 Tab by mouth daily. Qty: 30 Tab, Refills: 0         CONTINUE these medications which have NOT CHANGED    Details   fluticasone (FLONASE) 50 mcg/actuation nasal spray 2 Sprays by Both Nostrils route daily. 1 spray in each nostril.   Qty: 1 Bottle, Refills: 0               NOTIFY YOUR PHYSICIAN FOR ANY OF THE FOLLOWING:   Fever over 101 degrees for 24 hours. Chest pain, shortness of breath, fever, chills, nausea, vomiting, diarrhea, change in mentation, falling, weakness, bleeding. Severe pain or pain not relieved by medications. Or, any other signs or symptoms that you may have questions about.     DISPOSITION:    Home With:   OT  PT  HH  RN       Long term SNF/Inpatient Rehab   x Independent/assisted living    Hospice    Other:       PATIENT CONDITION AT DISCHARGE:     Functional status    Poor     Deconditioned    x Independent      Cognition   x  Lucid     Forgetful     Dementia      Catheters/lines (plus indication)    Strauss     PICC     PEG    x None      Code status    x Full code     DNR      PHYSICAL EXAMINATION AT DISCHARGE:   Refer to Progress Note      CHRONIC MEDICAL DIAGNOSES:  Problem List as of 7/22/2019 Never Reviewed          Codes Class Noted - Resolved    * (Principal) Acute pancreatitis ICD-10-CM: K85.90  ICD-9-CM: 770.0  7/19/2019 - Present              Greater than 45 minutes were spent with the patient on counseling and coordination of care    Signed:   Jose Alfredo Velez MD  7/22/2019  6:28 PM

## 2019-07-22 NOTE — PROGRESS NOTES
Hospitalist Progress Note  Jose Alfredo Velez MD  Answering service: [phone] from in house phone  Cell: [phone]      Date of Service:  2019  NAME:  Idalia Patel  :  1985  MRN:  [id number]      Admission Summary:     A 29 y.o man presents with abdominal pain. Symptoms began on the night of 19 after eating dinner and drinking a beer. The pain is sharp in the epigastrium and LUQ. It was constant with intermittent flares, non-radiating. Symptoms are exacerbated by moving and eating, no known alleviating factors, associated symptoms include nausea, vomiting, no diarrhea, constipation, bleeding, or fever. The symptoms progressively worsened prompting him to come to the ED. He denies similar symptoms in the past. He denies heavy alcohol use, states he drinks about 3 times a week and denies binge drinking. Interval history / Subjective:     He said abdominal pain improving, no nausea or vomiting     Assessment & Plan:     Acute pancreatitis   -start clear liquid diet, cut back lactated ringer and prn iv dilaudid   -off iv cefepime and metronidazole on   -repeated lipase normal 276 , it was >3000 ,   -TG normal  -CT abdomen pelvis pancreatitis. Possible developing necrosis of the head and body. No drainable fluid collection. Small volume of ascites. Transverse colon and distal colon colitis versus under distention. Recommendation: CT pancreas in 4-6 weeks to reassess  -US of abdomen acute pancreatitis, better appreciated on the comparison CT, normal gallbladder and CBD. -continue IVF with folic acid, thiamin, mvi and vitamin k  -GI on board    Leukocytosis likely due to acute pancreatitis  -spike fevere, wbc improving to 14.7 from 18.6,   -repeated CT abd pelvis on roughly stable changes of acute pancreatitis without new complication. Moderate abdominal and pelvic ascites, stable.  new bilateral moderate pleural effusions and bibasilar atelectasis. Elevated BP no hx of HTN  -BP improving, monitor BP    Mild hyponatremia   -multifactorial, repeat bmp        Code status: Full Code   DVT prophylaxis: lovenox    Care Plan discussed with: Patient/Family and Nurse  Disposition: Home with family     Hospital Problems  Never Reviewed          Codes Class Noted POA    * (Principal) Acute pancreatitis ICD-10-CM: K85.90  ICD-9-CM: 577.0  7/19/2019 Unknown              Vital Signs:    Last 24hrs VS reviewed since prior progress note. Most recent are:  Visit Vitals  /88 (BP 1 Location: Left arm, BP Patient Position: At rest)   Pulse 96   Temp 97.8 °F (36.6 °C)   Resp 16   Ht 5' 9\" (1.753 m)   Wt 73.5 kg (162 lb)   SpO2 97%   BMI 23.92 kg/m²       No intake or output data in the 24 hours ending 07/22/19 1020     Physical Examination:             Constitutional:  No acute distress, cooperative, pleasant    ENT:  Oral mucous moist, oropharynx benign. Neck supple,    Resp:  CTA bilaterally. No wheezing/rhonchi/rales. No accessory muscle use   CV:  Regular rhythm, normal rate, no murmurs, gallops, rubs    GI:  Soft, not distended, slight tender. normoactive bowel sounds, no hepatosplenomegaly     Musculoskeletal:  No edema    Neurologic:  Moves all extremities.   AAOx3, CN II-XII reviewed     Skin:  Good turgor, no rashes or ulcers       Data Review:    Review and/or order of clinical lab test  Review and/or order of tests in the radiology section of CPT  Review and/or order of tests in the medicine section of CPT      Labs:     Recent Labs     07/22/19  0532 07/21/19  0044   WBC 14.7* 18.4*   HGB 13.3 14.3   HCT 38.9 42.2    161     Recent Labs     07/22/19  0532 07/21/19  0044 07/20/19  0546   * 133* 135*   K 3.8 3.8 4.1    101 103   CO2 24 24 25   BUN 8 7 8   CREA 0.72 0.70 0.76   GLU 92 103* 87   CA 8.3* 8.4* 8.4*   MG  --   --  1.9   PHOS  --   --  2.3*     Recent Labs     07/22/19  0532 07/21/19  0044 07/20/19  0546   SGOT 17 18  --    ALT 16 16  --    AP 74 74  --    TBILI 1.0 0.9  --    TP 6.2* 6.3*  --    ALB 2.5* 2.9*  --    GLOB 3.7 3.4  --    LPSE 276 1,123* >3,000*     No results for input(s): INR, PTP, APTT in the last 72 hours. No lab exists for component: INREXT, INREXT   No results for input(s): FE, TIBC, PSAT, FERR in the last 72 hours. No results found for: FOL, RBCF   No results for input(s): PH, PCO2, PO2 in the last 72 hours. No results for input(s): CPK, CKNDX, TROIQ in the last 72 hours. No lab exists for component: CPKMB  Lab Results   Component Value Date/Time    Cholesterol, total 175 07/18/2019 10:13 PM    HDL Cholesterol 66 07/18/2019 10:13 PM    LDL, calculated 101.8 (H) 07/18/2019 10:13 PM    Triglyceride 36 07/18/2019 10:13 PM    CHOL/HDL Ratio 2.7 07/18/2019 10:13 PM     No results found for: GLUCPOC  Lab Results   Component Value Date/Time    Color DARK YELLOW 07/18/2019 10:13 PM    Appearance CLEAR 07/18/2019 10:13 PM    Specific gravity >1.030 (H) 07/18/2019 10:13 PM    pH (UA) 6.0 07/18/2019 10:13 PM    Protein 100 (A) 07/18/2019 10:13 PM    Glucose 100 (A) 07/18/2019 10:13 PM    Ketone NEGATIVE  07/18/2019 10:13 PM    Bilirubin NEGATIVE  07/18/2019 10:13 PM    Urobilinogen 0.2 07/18/2019 10:13 PM    Nitrites NEGATIVE  07/18/2019 10:13 PM    Leukocyte Esterase NEGATIVE  07/18/2019 10:13 PM    Epithelial cells FEW 07/18/2019 10:13 PM    Bacteria NEGATIVE  07/18/2019 10:13 PM    WBC 0-4 07/18/2019 10:13 PM    RBC 5-10 07/18/2019 10:13 PM         Medications Reviewed:     Current Facility-Administered Medications   Medication Dose Route Frequency    lactated Ringers infusion  150 mL/hr IntraVENous CONTINUOUS    dextrose 5 % - 0.45% NaCl 8,309 mL with folic acid 1 mg, thiamine 100 mg, mvi, adult no. 4 with vit K 10 mL infusion   IntraVENous DAILY    sodium chloride (NS) flush 5-40 mL  5-40 mL IntraVENous Q8H    sodium chloride (NS) flush 5-40 mL  5-40 mL IntraVENous PRN    acetaminophen (TYLENOL) tablet 650 mg  650 mg Oral Q4H PRN    HYDROmorphone (PF) (DILAUDID) injection 1 mg  1 mg IntraVENous Q4H PRN    ondansetron (ZOFRAN) injection 4 mg  4 mg IntraVENous Q4H PRN    enoxaparin (LOVENOX) injection 40 mg  40 mg SubCUTAneous Q24H    lactated Ringers infusion  200 mL/hr IntraVENous CONTINUOUS    oxyCODONE IR (ROXICODONE) tablet 5 mg  5 mg Oral Q4H PRN     ______________________________________________________________________  EXPECTED LENGTH OF STAY: 3d 4h  ACTUAL LENGTH OF STAY:          3                 Tanya Banks MD

## 2019-07-22 NOTE — DISCHARGE INSTRUCTIONS
Discharge Instructions       PATIENT ID: Lory Vazquez  MRN: [id number]   YOB: 1985    DATE OF ADMISSION: 7/18/2019  9:09 PM    DATE OF DISCHARGE: 7/22/2019    PRIMARY CARE PROVIDER: None     ATTENDING PHYSICIAN: Jack Grijalva MD  DISCHARGING PROVIDER: Elliot Infante MD    To contact this individual call [phone] and ask the  to page. If unavailable ask to be transferred the Adult Hospitalist Department. DISCHARGE DIAGNOSES   Acute pancreatitis   Leukocytosis likely due to acute pancreatitis  Elevated BP no hx of HTN  Mild hyponatremia     CONSULTATIONS: IP CONSULT TO GASTROENTEROLOGY    PROCEDURES/SURGERIES: * No surgery found *    PENDING TEST RESULTS:   At the time of discharge the following test results are still pending: None    FOLLOW UP APPOINTMENTS:   Follow-up Information     Follow up With Specialties Details Why Contact Info   1. Primary Care Physician   In one week  None (395) Patient stated that they have no PCP       2. Sophia Schneider MD, Gastroenterologist in 3 weeks    ADDITIONAL CARE RECOMMENDATIONS:     DIET: Cardiac Diet    ACTIVITY: Activity as tolerated    WOUND CARE: None    EQUIPMENT needed: None      DISCHARGE MEDICATIONS:   See Medication Reconciliation Form    · It is important that you take the medication exactly as they are prescribed. · Keep your medication in the bottles provided by the pharmacist and keep a list of the medication names, dosages, and times to be taken in your wallet. · Do not take other medications without consulting your doctor. NOTIFY YOUR PHYSICIAN FOR ANY OF THE FOLLOWING:   Fever over 101 degrees for 24 hours. Chest pain, shortness of breath, fever, chills, nausea, vomiting, diarrhea, change in mentation, falling, weakness, bleeding. Severe pain or pain not relieved by medications. Or, any other signs or symptoms that you may have questions about.       DISPOSITION:    Home With:   OT  PT  [facility]  RN SNF/Inpatient Rehab/[facility]   x Independent/assisted living    Hospice    Other:     CDMP Checked:   Yes x     PROBLEM LIST Updated:  Yes x       Signed:   Jenna Zhao MD  7/22/2019  6:23 PM

## 2019-07-23 NOTE — PROGRESS NOTES
I have reviewed discharge instructions with the patient and parent. The patient and parent verbalized understanding. Patient was taken by a PCT to Patient Discharge along with his discharge instructions, summary of care, prescription scripts, and other belongings.

## 2020-05-08 ENCOUNTER — HOSPITAL ENCOUNTER (INPATIENT)
Age: 35
LOS: 4 days | Discharge: HOME | DRG: 440 | End: 2020-05-12
Attending: EMERGENCY MEDICINE | Admitting: FAMILY MEDICINE
Payer: COMMERCIAL

## 2020-05-08 ENCOUNTER — APPOINTMENT (OUTPATIENT)
Dept: CT IMAGING | Age: 35
DRG: 440 | End: 2020-05-08
Attending: EMERGENCY MEDICINE
Payer: COMMERCIAL

## 2020-05-08 DIAGNOSIS — K85.90 ACUTE PANCREATITIS, UNSPECIFIED COMPLICATION STATUS, UNSPECIFIED PANCREATITIS TYPE: Primary | ICD-10-CM

## 2020-05-08 LAB
ALBUMIN SERPL-MCNC: 4.6 G/DL (ref 3.5–5)
ALBUMIN/GLOB SERPL: 1.2 {RATIO} (ref 1.1–2.2)
ALP SERPL-CCNC: 100 U/L (ref 45–117)
ALT SERPL-CCNC: 28 U/L (ref 12–78)
ANION GAP SERPL CALC-SCNC: 3 MMOL/L (ref 5–15)
APPEARANCE UR: CLEAR
AST SERPL-CCNC: 18 U/L (ref 15–37)
BACTERIA URNS QL MICRO: NEGATIVE /HPF
BASOPHILS # BLD: 0 K/UL (ref 0–0.1)
BASOPHILS NFR BLD: 0 % (ref 0–1)
BILIRUB SERPL-MCNC: 0.6 MG/DL (ref 0.2–1)
BILIRUB UR QL: NEGATIVE
BUN SERPL-MCNC: 14 MG/DL (ref 6–20)
BUN/CREAT SERPL: 13 (ref 12–20)
CALCIUM SERPL-MCNC: 9.7 MG/DL (ref 8.5–10.1)
CHLORIDE SERPL-SCNC: 106 MMOL/L (ref 97–108)
CO2 SERPL-SCNC: 31 MMOL/L (ref 21–32)
COLOR UR: ABNORMAL
COMMENT, HOLDF: NORMAL
CREAT SERPL-MCNC: 1.04 MG/DL (ref 0.7–1.3)
DIFFERENTIAL METHOD BLD: ABNORMAL
EOSINOPHIL # BLD: 0 K/UL (ref 0–0.4)
EOSINOPHIL NFR BLD: 0 % (ref 0–7)
EPITH CASTS URNS QL MICRO: ABNORMAL /LPF
ERYTHROCYTE [DISTWIDTH] IN BLOOD BY AUTOMATED COUNT: 12.4 % (ref 11.5–14.5)
GLOBULIN SER CALC-MCNC: 3.7 G/DL (ref 2–4)
GLUCOSE SERPL-MCNC: 115 MG/DL (ref 65–100)
GLUCOSE UR STRIP.AUTO-MCNC: NEGATIVE MG/DL
HCT VFR BLD AUTO: 49 % (ref 36.6–50.3)
HGB BLD-MCNC: 16.1 G/DL (ref 12.1–17)
HGB UR QL STRIP: NEGATIVE
HYALINE CASTS URNS QL MICRO: ABNORMAL /LPF (ref 0–5)
IMM GRANULOCYTES # BLD AUTO: 0.1 K/UL (ref 0–0.04)
IMM GRANULOCYTES NFR BLD AUTO: 1 % (ref 0–0.5)
KETONES UR QL STRIP.AUTO: ABNORMAL MG/DL
LEUKOCYTE ESTERASE UR QL STRIP.AUTO: NEGATIVE
LIPASE SERPL-CCNC: >3000 U/L (ref 73–393)
LYMPHOCYTES # BLD: 1.1 K/UL (ref 0.8–3.5)
LYMPHOCYTES NFR BLD: 8 % (ref 12–49)
MCH RBC QN AUTO: 28.8 PG (ref 26–34)
MCHC RBC AUTO-ENTMCNC: 32.9 G/DL (ref 30–36.5)
MCV RBC AUTO: 87.7 FL (ref 80–99)
MONOCYTES # BLD: 0.6 K/UL (ref 0–1)
MONOCYTES NFR BLD: 4 % (ref 5–13)
NEUTS SEG # BLD: 12.1 K/UL (ref 1.8–8)
NEUTS SEG NFR BLD: 87 % (ref 32–75)
NITRITE UR QL STRIP.AUTO: NEGATIVE
NRBC # BLD: 0 K/UL (ref 0–0.01)
NRBC BLD-RTO: 0 PER 100 WBC
PH UR STRIP: 5.5 [PH] (ref 5–8)
PLATELET # BLD AUTO: 210 K/UL (ref 150–400)
PMV BLD AUTO: 11.6 FL (ref 8.9–12.9)
POTASSIUM SERPL-SCNC: 4.6 MMOL/L (ref 3.5–5.1)
PROT SERPL-MCNC: 8.3 G/DL (ref 6.4–8.2)
PROT UR STRIP-MCNC: ABNORMAL MG/DL
RBC # BLD AUTO: 5.59 M/UL (ref 4.1–5.7)
RBC #/AREA URNS HPF: ABNORMAL /HPF (ref 0–5)
SAMPLES BEING HELD,HOLD: NORMAL
SODIUM SERPL-SCNC: 140 MMOL/L (ref 136–145)
SP GR UR REFRACTOMETRY: 1.01
UROBILINOGEN UR QL STRIP.AUTO: 0.2 EU/DL (ref 0.2–1)
WBC # BLD AUTO: 14 K/UL (ref 4.1–11.1)
WBC URNS QL MICRO: ABNORMAL /HPF (ref 0–4)

## 2020-05-08 PROCEDURE — 96374 THER/PROPH/DIAG INJ IV PUSH: CPT

## 2020-05-08 PROCEDURE — 81001 URINALYSIS AUTO W/SCOPE: CPT

## 2020-05-08 PROCEDURE — 74011000250 HC RX REV CODE- 250: Performed by: EMERGENCY MEDICINE

## 2020-05-08 PROCEDURE — 74011000258 HC RX REV CODE- 258: Performed by: RADIOLOGY

## 2020-05-08 PROCEDURE — 36415 COLL VENOUS BLD VENIPUNCTURE: CPT

## 2020-05-08 PROCEDURE — 65270000032 HC RM SEMIPRIVATE

## 2020-05-08 PROCEDURE — C9113 INJ PANTOPRAZOLE SODIUM, VIA: HCPCS | Performed by: EMERGENCY MEDICINE

## 2020-05-08 PROCEDURE — 74011000258 HC RX REV CODE- 258: Performed by: FAMILY MEDICINE

## 2020-05-08 PROCEDURE — 74011636320 HC RX REV CODE- 636/320: Performed by: RADIOLOGY

## 2020-05-08 PROCEDURE — 96375 TX/PRO/DX INJ NEW DRUG ADDON: CPT

## 2020-05-08 PROCEDURE — 80307 DRUG TEST PRSMV CHEM ANLYZR: CPT

## 2020-05-08 PROCEDURE — 80053 COMPREHEN METABOLIC PANEL: CPT

## 2020-05-08 PROCEDURE — 85025 COMPLETE CBC W/AUTO DIFF WBC: CPT

## 2020-05-08 PROCEDURE — 83690 ASSAY OF LIPASE: CPT

## 2020-05-08 PROCEDURE — 74011250636 HC RX REV CODE- 250/636: Performed by: FAMILY MEDICINE

## 2020-05-08 PROCEDURE — 74011250636 HC RX REV CODE- 250/636: Performed by: EMERGENCY MEDICINE

## 2020-05-08 PROCEDURE — 74177 CT ABD & PELVIS W/CONTRAST: CPT

## 2020-05-08 PROCEDURE — 99283 EMERGENCY DEPT VISIT LOW MDM: CPT

## 2020-05-08 RX ORDER — HYDROMORPHONE HYDROCHLORIDE 1 MG/ML
1 INJECTION, SOLUTION INTRAMUSCULAR; INTRAVENOUS; SUBCUTANEOUS
Status: DISCONTINUED | OUTPATIENT
Start: 2020-05-08 | End: 2020-05-12 | Stop reason: HOSPADM

## 2020-05-08 RX ORDER — SODIUM CHLORIDE, SODIUM LACTATE, POTASSIUM CHLORIDE, CALCIUM CHLORIDE 600; 310; 30; 20 MG/100ML; MG/100ML; MG/100ML; MG/100ML
150 INJECTION, SOLUTION INTRAVENOUS CONTINUOUS
Status: DISCONTINUED | OUTPATIENT
Start: 2020-05-08 | End: 2020-05-12 | Stop reason: HOSPADM

## 2020-05-08 RX ORDER — SODIUM CHLORIDE 0.9 % (FLUSH) 0.9 %
10 SYRINGE (ML) INJECTION
Status: COMPLETED | OUTPATIENT
Start: 2020-05-08 | End: 2020-05-08

## 2020-05-08 RX ORDER — METRONIDAZOLE 500 MG/100ML
500 INJECTION, SOLUTION INTRAVENOUS EVERY 12 HOURS
Status: DISCONTINUED | OUTPATIENT
Start: 2020-05-08 | End: 2020-05-09

## 2020-05-08 RX ORDER — SODIUM CHLORIDE 0.9 % (FLUSH) 0.9 %
5-40 SYRINGE (ML) INJECTION AS NEEDED
Status: DISCONTINUED | OUTPATIENT
Start: 2020-05-08 | End: 2020-05-12 | Stop reason: HOSPADM

## 2020-05-08 RX ORDER — ONDANSETRON 2 MG/ML
4 INJECTION INTRAMUSCULAR; INTRAVENOUS
Status: COMPLETED | OUTPATIENT
Start: 2020-05-08 | End: 2020-05-08

## 2020-05-08 RX ORDER — SODIUM CHLORIDE 0.9 % (FLUSH) 0.9 %
5-40 SYRINGE (ML) INJECTION EVERY 8 HOURS
Status: DISCONTINUED | OUTPATIENT
Start: 2020-05-08 | End: 2020-05-12 | Stop reason: HOSPADM

## 2020-05-08 RX ORDER — KETOROLAC TROMETHAMINE 30 MG/ML
15 INJECTION, SOLUTION INTRAMUSCULAR; INTRAVENOUS
Status: COMPLETED | OUTPATIENT
Start: 2020-05-08 | End: 2020-05-08

## 2020-05-08 RX ORDER — HEPARIN SODIUM 5000 [USP'U]/ML
5000 INJECTION, SOLUTION INTRAVENOUS; SUBCUTANEOUS EVERY 8 HOURS
Status: DISCONTINUED | OUTPATIENT
Start: 2020-05-08 | End: 2020-05-12 | Stop reason: HOSPADM

## 2020-05-08 RX ORDER — SODIUM CHLORIDE 9 MG/ML
150 INJECTION, SOLUTION INTRAVENOUS ONCE
Status: COMPLETED | OUTPATIENT
Start: 2020-05-08 | End: 2020-05-08

## 2020-05-08 RX ADMIN — ONDANSETRON 4 MG: 2 INJECTION INTRAMUSCULAR; INTRAVENOUS at 15:39

## 2020-05-08 RX ADMIN — SODIUM CHLORIDE 40 MG: 9 INJECTION INTRAMUSCULAR; INTRAVENOUS; SUBCUTANEOUS at 15:39

## 2020-05-08 RX ADMIN — IOPAMIDOL 100 ML: 755 INJECTION, SOLUTION INTRAVENOUS at 16:29

## 2020-05-08 RX ADMIN — KETOROLAC TROMETHAMINE 15 MG: 30 INJECTION, SOLUTION INTRAMUSCULAR at 15:39

## 2020-05-08 RX ADMIN — SODIUM CHLORIDE 150 ML/HR: 900 INJECTION, SOLUTION INTRAVENOUS at 17:44

## 2020-05-08 RX ADMIN — SODIUM CHLORIDE, SODIUM LACTATE, POTASSIUM CHLORIDE, AND CALCIUM CHLORIDE 150 ML/HR: 600; 310; 30; 20 INJECTION, SOLUTION INTRAVENOUS at 20:36

## 2020-05-08 RX ADMIN — SODIUM CHLORIDE 100 ML: 900 INJECTION, SOLUTION INTRAVENOUS at 16:29

## 2020-05-08 RX ADMIN — HYDROMORPHONE HYDROCHLORIDE 1 MG: 1 INJECTION, SOLUTION INTRAMUSCULAR; INTRAVENOUS; SUBCUTANEOUS at 20:53

## 2020-05-08 RX ADMIN — Medication 10 ML: at 21:02

## 2020-05-08 RX ADMIN — SODIUM CHLORIDE 1000 ML: 900 INJECTION, SOLUTION INTRAVENOUS at 15:40

## 2020-05-08 RX ADMIN — CEFTRIAXONE 1 G: 1 INJECTION, POWDER, FOR SOLUTION INTRAMUSCULAR; INTRAVENOUS at 20:59

## 2020-05-08 RX ADMIN — METRONIDAZOLE 500 MG: 500 INJECTION, SOLUTION INTRAVENOUS at 23:17

## 2020-05-08 RX ADMIN — Medication 10 ML: at 16:29

## 2020-05-08 NOTE — ED TRIAGE NOTES
Triage:  Pt to ED due to concerns over abd pain, nausea/vomiting since this AM.  Pt states history of pancreatitis and states these symptoms feel similar to past episode.

## 2020-05-08 NOTE — H&P
History & Physical    Primary Care Provider: None  Source of Information: Patient     History of Presenting Illness:   Patient is a 43-year-old gentleman with past medical history of pancreatitis presents to the hospital with the above-mentioned symptoms. Patient reports his symptoms started 2 days ago. Patient reports he started having some epigastric and right upper quadrant pain associated with nausea, poor appetite, vomiting, and fatigue. Patient reports today the pain was quite severe and he was concerned that he had symptoms similar to acute pancreatitis that he had a few years back. Patient came to the ER was found to have acute pancreatitis and was requested to be admitted under the hospitalist service. Patient reports that he drinks 3-4 beers a week. Reports that he smokes off and on. Denies any IV drug use  Patient also denies any headache, blurry vision, trouble swallowing, trouble with speech, chest pain, shortness of breath, COVID-19 exposure or contacts, fevers, chills, constipation, diarrhea, urinary symptoms, focal or generalized neurological weakness, falls, injuries, hematemesis, hemoptysis, hematuria, lightheadedness or dizziness     Review of Systems:  A comprehensive review of systems was negative except for that written in the History of Present Illness. Past Medical History:   Diagnosis Date    Pancreatitis       Past Surgical History:   Procedure Laterality Date    HX ANKLE FRACTURE TX      HX ORTHOPAEDIC      left ankle     Prior to Admission medications    Medication Sig Start Date End Date Taking? Authorizing Provider   folic acid (FOLVITE) 1 mg tablet Take 1 Tab by mouth daily. 7/23/19   Yogesh Mccarty MD   thiamine HCL (B-1) 100 mg tablet Take 1 Tab by mouth daily. 7/23/19   Yogesh Mccarty MD   fluticasone (FLONASE) 50 mcg/actuation nasal spray 2 Sprays by Both Nostrils route daily. 1 spray in each nostril.  11/30/12   Morris Kong Flavio Blake MD     Allergies   Allergen Reactions    Augmentin [Amoxicillin-Pot Clavulanate] Unknown (comments)      History reviewed. No pertinent family history. SOCIAL HISTORY:  Patient resides:  Independently x   Assisted Living    SNF    With family care       Smoking history:   None    Former    Chronic x     Alcohol history:   None    Social x   Chronic      Ambulates:   Independently x   w/cane    w/walker    w/wc    CODE STATUS:  DNR    Full x   Other      Objective:     Physical Exam:     Visit Vitals  /88 (BP 1 Location: Right arm, BP Patient Position: At rest)   Pulse (!) 59   Temp 98 °F (36.7 °C)   Resp 16   Ht 5' 10\" (1.778 m)   Wt 74.8 kg (165 lb)   SpO2 100%   BMI 23.68 kg/m²      O2 Device: Room air    General:  Alert, cooperative, no distress, appears stated age. Head:  Normocephalic, without obvious abnormality, atraumatic. Eyes:  Conjunctivae/corneas clear. PERRL, EOMs intact. Nose: Nares normal. Septum midline. Mucosa normal. No drainage or sinus tenderness. Throat: Lips, mucosa, and tongue normal. Teeth and gums normal.   Neck: Supple, symmetrical, trachea midline, no adenopathy, thyroid: no enlargement/tenderness/nodules, no carotid bruit and no JVD. Back:   Symmetric, no curvature. ROM normal. No CVA tenderness. Lungs:   Clear to auscultation bilaterally. Chest wall:  No tenderness or deformity. Heart:  Regular rate and rhythm, S1, S2 normal, no murmur, click, rub or gallop. Abdomen:   Soft, tender diffusely. Bowel sounds hypoactive. Extremities: Extremities normal, atraumatic, no cyanosis or edema. Pulses: 2+ and symmetric all extremities. Skin: Skin color, texture, turgor normal. No rashes or lesions   Neurologic: CNII-XII intact.                 Data Review:     Recent Days:  Recent Labs     05/08/20  1525   WBC 14.0*   HGB 16.1   HCT 49.0        Recent Labs     05/08/20  1525      K 4.6      CO2 31   *   BUN 14   CREA 1.04   CA 9. 7   ALB 4.6   SGOT 18   ALT 28     No results for input(s): PH, PCO2, PO2, HCO3, FIO2 in the last 72 hours. 24 Hour Results:  Recent Results (from the past 24 hour(s))   METABOLIC PANEL, COMPREHENSIVE    Collection Time: 05/08/20  3:25 PM   Result Value Ref Range    Sodium 140 136 - 145 mmol/L    Potassium 4.6 3.5 - 5.1 mmol/L    Chloride 106 97 - 108 mmol/L    CO2 31 21 - 32 mmol/L    Anion gap 3 (L) 5 - 15 mmol/L    Glucose 115 (H) 65 - 100 mg/dL    BUN 14 6 - 20 MG/DL    Creatinine 1.04 0.70 - 1.30 MG/DL    BUN/Creatinine ratio 13 12 - 20      GFR est AA >60 >60 ml/min/1.73m2    GFR est non-AA >60 >60 ml/min/1.73m2    Calcium 9.7 8.5 - 10.1 MG/DL    Bilirubin, total 0.6 0.2 - 1.0 MG/DL    ALT (SGPT) 28 12 - 78 U/L    AST (SGOT) 18 15 - 37 U/L    Alk. phosphatase 100 45 - 117 U/L    Protein, total 8.3 (H) 6.4 - 8.2 g/dL    Albumin 4.6 3.5 - 5.0 g/dL    Globulin 3.7 2.0 - 4.0 g/dL    A-G Ratio 1.2 1.1 - 2.2     CBC WITH AUTOMATED DIFF    Collection Time: 05/08/20  3:25 PM   Result Value Ref Range    WBC 14.0 (H) 4.1 - 11.1 K/uL    RBC 5.59 4. 10 - 5.70 M/uL    HGB 16.1 12.1 - 17.0 g/dL    HCT 49.0 36.6 - 50.3 %    MCV 87.7 80.0 - 99.0 FL    MCH 28.8 26.0 - 34.0 PG    MCHC 32.9 30.0 - 36.5 g/dL    RDW 12.4 11.5 - 14.5 %    PLATELET 791 027 - 424 K/uL    MPV 11.6 8.9 - 12.9 FL    NRBC 0.0 0  WBC    ABSOLUTE NRBC 0.00 0.00 - 0.01 K/uL    NEUTROPHILS 87 (H) 32 - 75 %    LYMPHOCYTES 8 (L) 12 - 49 %    MONOCYTES 4 (L) 5 - 13 %    EOSINOPHILS 0 0 - 7 %    BASOPHILS 0 0 - 1 %    IMMATURE GRANULOCYTES 1 (H) 0.0 - 0.5 %    ABS. NEUTROPHILS 12.1 (H) 1.8 - 8.0 K/UL    ABS. LYMPHOCYTES 1.1 0.8 - 3.5 K/UL    ABS. MONOCYTES 0.6 0.0 - 1.0 K/UL    ABS. EOSINOPHILS 0.0 0.0 - 0.4 K/UL    ABS. BASOPHILS 0.0 0.0 - 0.1 K/UL    ABS. IMM.  GRANS. 0.1 (H) 0.00 - 0.04 K/UL    DF AUTOMATED     LIPASE    Collection Time: 05/08/20  3:25 PM   Result Value Ref Range    Lipase >3,000 (H) 73 - 393 U/L   SAMPLES BEING HELD    Collection Time: 05/08/20  3:25 PM   Result Value Ref Range    SAMPLES BEING HELD 1BLU,1RED     COMMENT        Add-on orders for these samples will be processed based on acceptable specimen integrity and analyte stability, which may vary by analyte. URINALYSIS W/ RFLX MICROSCOPIC    Collection Time: 05/08/20  3:25 PM   Result Value Ref Range    Color YELLOW/STRAW      Appearance CLEAR CLEAR      Specific gravity 1.010      pH (UA) 5.5 5.0 - 8.0      Protein TRACE (A) NEG mg/dL    Glucose Negative NEG mg/dL    Ketone TRACE (A) NEG mg/dL    Bilirubin Negative NEG      Blood Negative NEG      Urobilinogen 0.2 0.2 - 1.0 EU/dL    Nitrites Negative NEG      Leukocyte Esterase Negative NEG      WBC 0-4 0 - 4 /hpf    RBC 0-5 0 - 5 /hpf    Epithelial cells FEW FEW /lpf    Bacteria Negative NEG /hpf    Hyaline cast 0-2 0 - 5 /lpf         Imaging:     Assessment:       Plan:     1. Acute pancreatitis: Patient will be admitted to the hospital, start patient on lactated Ringer, IV Dilaudid for pain control, IV antiemetics, n.p.o., patient has history of necrotizing pancreatitis and currently has mildly elevated white count will start empirical antibiotics, supportive care, close monitoring, if symptoms persist and or acute concerns may consider GI consult. 2.  Dehydration: Patient appears to be mildly dehydrated, will provide IV hydration, repeat labs in the morning, supportive care and close monitoring  #3.   GI DVT prophylaxis: Patient will be on heparin       Signed By: Zac Lopez MD     May 8, 2020

## 2020-05-08 NOTE — ED PROVIDER NOTES
HPI patient is a 30-year-old white male who presents the ED with epigastric to left upper quadrant pain accompanied by multiple episodes of nonbloody vomiting for 2 days. He has a history of pancreatitis last July 2019 and states that the symptoms feel similar. Denies fever, cold symptoms, cough, headache, neck pain, visual changes, focal weakness or rash. Denies any difficulty breathing, difficulty swallowing, SOB or chest pain. Denies any nausea, vomiting or diarrhea. Pt. Reports that he has not had any solid food or medications since yesterday. Past Medical History:   Diagnosis Date    Pancreatitis        Past Surgical History:   Procedure Laterality Date    HX ANKLE FRACTURE TX      HX ORTHOPAEDIC      left ankle         History reviewed. No pertinent family history. Social History     Socioeconomic History    Marital status: SINGLE     Spouse name: Not on file    Number of children: Not on file    Years of education: Not on file    Highest education level: Not on file   Occupational History    Not on file   Social Needs    Financial resource strain: Not on file    Food insecurity     Worry: Not on file     Inability: Not on file    Transportation needs     Medical: Not on file     Non-medical: Not on file   Tobacco Use    Smoking status: Former Smoker    Smokeless tobacco: Never Used   Substance and Sexual Activity    Alcohol use:  Yes    Drug use: Not Currently    Sexual activity: Not on file   Lifestyle    Physical activity     Days per week: Not on file     Minutes per session: Not on file    Stress: Not on file   Relationships    Social connections     Talks on phone: Not on file     Gets together: Not on file     Attends Rastafari service: Not on file     Active member of club or organization: Not on file     Attends meetings of clubs or organizations: Not on file     Relationship status: Not on file    Intimate partner violence     Fear of current or ex partner: Not on file Emotionally abused: Not on file     Physically abused: Not on file     Forced sexual activity: Not on file   Other Topics Concern    Not on file   Social History Narrative    Not on file         ALLERGIES: Augmentin [amoxicillin-pot clavulanate]    Review of Systems   Constitutional: Negative for activity change, appetite change, fever and unexpected weight change. HENT: Negative for congestion and trouble swallowing. Eyes: Negative for visual disturbance. Respiratory: Negative for cough and shortness of breath. Cardiovascular: Negative for chest pain, palpitations and leg swelling. Gastrointestinal: Positive for abdominal pain, nausea and vomiting. Genitourinary: Negative for difficulty urinating and dysuria. Musculoskeletal: Negative for arthralgias and myalgias. Skin: Negative for rash. Neurological: Negative for dizziness, light-headedness and headaches. Psychiatric/Behavioral: Negative for suicidal ideas. The patient is nervous/anxious. All other systems reviewed and are negative. Vitals:    05/08/20 1438   BP: (!) 138/97   Pulse: (!) 56   Resp: 16   Temp: 98 °F (36.7 °C)   SpO2: 100%   Weight: 74.8 kg (165 lb)   Height: 5' 10\" (1.778 m)            Physical Exam  Vitals signs and nursing note reviewed. Constitutional:       General: He is not in acute distress. Appearance: He is well-developed and normal weight. He is not ill-appearing, toxic-appearing or diaphoretic. Comments: White male, lives alone, social smoker   HENT:      Head: Normocephalic. Mouth/Throat:      Mouth: Mucous membranes are moist.   Cardiovascular:      Rate and Rhythm: Normal rate and regular rhythm. Pulmonary:      Effort: Pulmonary effort is normal.      Breath sounds: Normal breath sounds. Abdominal:      General: There are no signs of injury. Palpations: Abdomen is soft. Tenderness:  There is abdominal tenderness in the right upper quadrant, epigastric area and left upper quadrant. There is no guarding or rebound. Skin:     General: Skin is warm and dry. Findings: No rash. Neurological:      Mental Status: He is alert and oriented to person, place, and time. Psychiatric:         Behavior: Behavior normal.          MDM       Procedures    Pt has  been reexamined and to continues to complain of nausea and left upper quadrant abdominal pain; CT reveals acute pancreatitis; lipase is greater than 3000. Consult hospitalist to admit. Hospitalist Perfect Serve for Admission  5:30 PM    ED Room Number: R37/R37  Patient Name and age:  Estela Villagomez 36 y.o.  male  Working Diagnosis:   1. Acute pancreatitis, unspecified complication status, unspecified pancreatitis type        COVID-19 Suspicion:  no    Readmission: no  Isolation Requirements:  no  Recommended Level of Care:  med/surg  Department:[facility] - 21   Other:         8:41 PM  Patient's results and plan of care have been reviewed with him. Patient has verbally conveyed his understanding and agreement of his signs, symptoms, diagnosis, treatment and prognosis and additionally agrees to be admitted.  Telma Song NP  Discussed plan of care with Dr. Alex Yuen NP

## 2020-05-08 NOTE — ROUTINE PROCESS
TRANSFER - OUT REPORT: 
 
Verbal report given to carly RN(name) on Smita Ochoa  being transferred to (unit) for routine progression of care Report consisted of patients Situation, Background, Assessment and  
Recommendations(SBAR). Information from the following report(s) SBAR, Kardex, ED Summary and MAR was reviewed with the receiving nurse. Lines:  
Peripheral IV 05/08/20 Right Antecubital (Active) Opportunity for questions and clarification was provided. Patient transported with: 
 [employer]

## 2020-05-09 LAB
ALBUMIN SERPL-MCNC: 3.4 G/DL (ref 3.5–5)
ALBUMIN/GLOB SERPL: 1.2 {RATIO} (ref 1.1–2.2)
ALP SERPL-CCNC: 76 U/L (ref 45–117)
ALT SERPL-CCNC: 20 U/L (ref 12–78)
AMPHET UR QL SCN: NEGATIVE
ANION GAP SERPL CALC-SCNC: 4 MMOL/L (ref 5–15)
AST SERPL-CCNC: 16 U/L (ref 15–37)
BARBITURATES UR QL SCN: NEGATIVE
BENZODIAZ UR QL: NEGATIVE
BILIRUB SERPL-MCNC: 0.8 MG/DL (ref 0.2–1)
BUN SERPL-MCNC: 14 MG/DL (ref 6–20)
BUN/CREAT SERPL: 14 (ref 12–20)
CALCIUM SERPL-MCNC: 8.4 MG/DL (ref 8.5–10.1)
CANNABINOIDS UR QL SCN: NEGATIVE
CHLORIDE SERPL-SCNC: 108 MMOL/L (ref 97–108)
CO2 SERPL-SCNC: 27 MMOL/L (ref 21–32)
COCAINE UR QL SCN: NEGATIVE
CREAT SERPL-MCNC: 0.99 MG/DL (ref 0.7–1.3)
DRUG SCRN COMMENT,DRGCM: ABNORMAL
ERYTHROCYTE [DISTWIDTH] IN BLOOD BY AUTOMATED COUNT: 12.5 % (ref 11.5–14.5)
ETHANOL SERPL-MCNC: <10 MG/DL
GLOBULIN SER CALC-MCNC: 2.9 G/DL (ref 2–4)
GLUCOSE SERPL-MCNC: 120 MG/DL (ref 65–100)
HCT VFR BLD AUTO: 43 % (ref 36.6–50.3)
HGB BLD-MCNC: 14 G/DL (ref 12.1–17)
LIPASE SERPL-CCNC: >3000 U/L (ref 73–393)
MAGNESIUM SERPL-MCNC: 2.1 MG/DL (ref 1.6–2.4)
MCH RBC QN AUTO: 28.8 PG (ref 26–34)
MCHC RBC AUTO-ENTMCNC: 32.6 G/DL (ref 30–36.5)
MCV RBC AUTO: 88.5 FL (ref 80–99)
METHADONE UR QL: NEGATIVE
NRBC # BLD: 0 K/UL (ref 0–0.01)
NRBC BLD-RTO: 0 PER 100 WBC
OPIATES UR QL: POSITIVE
PCP UR QL: NEGATIVE
PHOSPHATE SERPL-MCNC: 4.3 MG/DL (ref 2.6–4.7)
PLATELET # BLD AUTO: 179 K/UL (ref 150–400)
PMV BLD AUTO: 11.6 FL (ref 8.9–12.9)
POTASSIUM SERPL-SCNC: 4.3 MMOL/L (ref 3.5–5.1)
PROT SERPL-MCNC: 6.3 G/DL (ref 6.4–8.2)
RBC # BLD AUTO: 4.86 M/UL (ref 4.1–5.7)
SODIUM SERPL-SCNC: 139 MMOL/L (ref 136–145)
WBC # BLD AUTO: 11.2 K/UL (ref 4.1–11.1)

## 2020-05-09 PROCEDURE — 80307 DRUG TEST PRSMV CHEM ANLYZR: CPT

## 2020-05-09 PROCEDURE — 74011000250 HC RX REV CODE- 250: Performed by: HOSPITALIST

## 2020-05-09 PROCEDURE — 74011250636 HC RX REV CODE- 250/636: Performed by: NURSE PRACTITIONER

## 2020-05-09 PROCEDURE — 83735 ASSAY OF MAGNESIUM: CPT

## 2020-05-09 PROCEDURE — 74011250636 HC RX REV CODE- 250/636: Performed by: FAMILY MEDICINE

## 2020-05-09 PROCEDURE — 84100 ASSAY OF PHOSPHORUS: CPT

## 2020-05-09 PROCEDURE — 85027 COMPLETE CBC AUTOMATED: CPT

## 2020-05-09 PROCEDURE — 65270000032 HC RM SEMIPRIVATE

## 2020-05-09 PROCEDURE — 83690 ASSAY OF LIPASE: CPT

## 2020-05-09 PROCEDURE — 80053 COMPREHEN METABOLIC PANEL: CPT

## 2020-05-09 PROCEDURE — 74011250636 HC RX REV CODE- 250/636: Performed by: HOSPITALIST

## 2020-05-09 PROCEDURE — 36415 COLL VENOUS BLD VENIPUNCTURE: CPT

## 2020-05-09 RX ORDER — CETIRIZINE HCL 10 MG
10 TABLET ORAL
COMMUNITY

## 2020-05-09 RX ORDER — LORAZEPAM 2 MG/ML
2 INJECTION INTRAMUSCULAR
Status: DISCONTINUED | OUTPATIENT
Start: 2020-05-09 | End: 2020-05-12 | Stop reason: HOSPADM

## 2020-05-09 RX ORDER — ONDANSETRON 2 MG/ML
4 INJECTION INTRAMUSCULAR; INTRAVENOUS
Status: DISCONTINUED | OUTPATIENT
Start: 2020-05-09 | End: 2020-05-12 | Stop reason: HOSPADM

## 2020-05-09 RX ORDER — BISMUTH SUBSALICYLATE 262 MG
1 TABLET,CHEWABLE ORAL DAILY
COMMUNITY

## 2020-05-09 RX ORDER — IBUPROFEN 200 MG
200-400 TABLET ORAL AS NEEDED
COMMUNITY

## 2020-05-09 RX ORDER — LORAZEPAM 2 MG/ML
4 INJECTION INTRAMUSCULAR
Status: DISCONTINUED | OUTPATIENT
Start: 2020-05-09 | End: 2020-05-12 | Stop reason: HOSPADM

## 2020-05-09 RX ADMIN — METRONIDAZOLE 500 MG: 500 INJECTION, SOLUTION INTRAVENOUS at 10:51

## 2020-05-09 RX ADMIN — ONDANSETRON 4 MG: 2 INJECTION INTRAMUSCULAR; INTRAVENOUS at 02:14

## 2020-05-09 RX ADMIN — SODIUM CHLORIDE, SODIUM LACTATE, POTASSIUM CHLORIDE, AND CALCIUM CHLORIDE 150 ML/HR: 600; 310; 30; 20 INJECTION, SOLUTION INTRAVENOUS at 02:17

## 2020-05-09 RX ADMIN — HYDROMORPHONE HYDROCHLORIDE 1 MG: 1 INJECTION, SOLUTION INTRAMUSCULAR; INTRAVENOUS; SUBCUTANEOUS at 01:55

## 2020-05-09 RX ADMIN — FOLIC ACID: 5 INJECTION, SOLUTION INTRAMUSCULAR; INTRAVENOUS; SUBCUTANEOUS at 19:04

## 2020-05-09 NOTE — PROGRESS NOTES
Admission Medication Reconciliation:    Information obtained from:  Patient  RxQuery data available¹:  no    Comments    1)  Spoke with patient over the phone to update PTA medication list.    2)  Medication changes (since last review): Added  - Zyrtec, Ibuprofen, Multivitamin     Adjusted  - none    Removed  - Flonase, thiamine, Folic acid. 3) Alcohol intake:  Patient reported (5-7 drinks/week). ¹RxQuery pharmacy benefit data reflects medications filled and processed through the patient's insurance, however   this data does NOT capture whether the medication was picked up or is currently being taken by the patient. Allergies:  Augmentin [amoxicillin-pot clavulanate]    Significant PMH/Disease States:   Past Medical History:   Diagnosis Date    Pancreatitis      Chief Complaint for this Admission:    Chief Complaint   Patient presents with    Abdominal Pain     Prior to Admission Medications:   Prior to Admission Medications   Prescriptions Last Dose Informant Taking? cetirizine (ZyrTEC) 10 mg tablet   Yes   Sig: Take 10 mg by mouth daily as needed for Allergies. ibuprofen (MOTRIN) 200 mg tablet   Yes   Sig: Take 200-400 mg by mouth as needed for Pain.   multivitamin (ONE A DAY) tablet   Yes   Sig: Take 1 Tab by mouth daily. Facility-Administered Medications: None       Please contact the main inpatient pharmacy with any questions or concerns at [phone] and we will direct you to the clinical pharmacist covering this patient's care while in-house.    Ruben Kingsley, JAYYD

## 2020-05-09 NOTE — CONSULTS
[address]  [address] [facility], [address] [place] [address]       GASTROENTEROLOGY CONSULTATION NOTE        NAME:  Lubna Gimenez   :   1985   MRN:   [id number]           Consult Date: 2020 11:47 AM      History of Present Illness:  Patient is a 36 y.o. who is seen in consultation at the request of Dr. Lowell Nuñez for acute pancreatitis. He had it before last July from ETOH abuse. He drinks almost daily few beers, c/o sever epigastric pain and vomiting yesterday, feels better today, CT scan= acute pancreatitis with calcifications, started on IV abx because of h/o pancreatic necrosis. PMH:  Past Medical History:   Diagnosis Date    Pancreatitis        PSH:  Past Surgical History:   Procedure Laterality Date    HX ANKLE FRACTURE TX      HX ORTHOPAEDIC      left ankle       Allergies: Allergies   Allergen Reactions    Augmentin [Amoxicillin-Pot Clavulanate] Unknown (comments)       Home Medications:  Prior to Admission Medications   Prescriptions Last Dose Informant Patient Reported? Taking?   fluticasone (FLONASE) 50 mcg/actuation nasal spray Not Taking at Unknown time  No No   Si Sprays by Both Nostrils route daily. 1 spray in each nostril. folic acid (FOLVITE) 1 mg tablet Not Taking at Unknown time  No No   Sig: Take 1 Tab by mouth daily. thiamine HCL (B-1) 100 mg tablet Not Taking at Unknown time  No No   Sig: Take 1 Tab by mouth daily.       Facility-Administered Medications: None       Hospital Medications:  Current Facility-Administered Medications   Medication Dose Route Frequency    ondansetron (ZOFRAN) injection 4 mg  4 mg IntraVENous Q4H PRN    sodium chloride (NS) flush 5-40 mL  5-40 mL IntraVENous Q8H    sodium chloride (NS) flush 5-40 mL  5-40 mL IntraVENous PRN    HYDROmorphone (PF) (DILAUDID) injection 1 mg  1 mg IntraVENous Q3H PRN    cefTRIAXone (ROCEPHIN) 1 g in 0.9% sodium chloride (MBP/ADV) 50 mL  1 g IntraVENous Q24H    metroNIDAZOLE (FLAGYL) IVPB premix 500 mg  500 mg IntraVENous Q12H    heparin (porcine) injection 5,000 Units  5,000 Units SubCUTAneous Q8H    lactated Ringers infusion  150 mL/hr IntraVENous CONTINUOUS       Social History:  Social History     Tobacco Use    Smoking status: Former Smoker    Smokeless tobacco: Never Used   Substance Use Topics    Alcohol use: Yes       Family History:  History reviewed. No pertinent family history. Review of Systems:  Constitutional: negative fever, negative chills, negative weight loss  Eyes:   negative visual changes  ENT:   negative sore throat, tongue or lip swelling  Respiratory:  negative cough, negative dyspnea  Cards:  negative for chest pain, palpitations, lower extremity edema  GI:   See HPI  :  negative for frequency, dysuria  Integument:  negative for rash and pruritus  Heme:  negative for easy bruising and gum/nose bleeding  Musculoskel: negative for myalgias,  back pain and muscle weakness  Neuro: negative for headaches, dizziness, vertigo  Psych:  negative for feelings of anxiety, depression     Objective:     Patient Vitals for the past 8 hrs:   BP Temp Pulse Resp SpO2   05/09/20 0749 147/76 98.4 °F (36.9 °C) 88 18 98 %     No intake/output data recorded. 05/07 1901 - 05/09 0700  In: -   Out: 350 [Urine:150]    EXAM:     NEURO-a&o   HEENT-wnl   LUNGS-clear    COR-regular rate and rhythym     ABD-soft , no tenderness, no rebound, good bs     EXT-no edema     Data Review     Recent Labs     05/09/20  0014 05/08/20  1525   WBC 11.2* 14.0*   HGB 14.0 16.1   HCT 43.0 49.0    210     Recent Labs     05/09/20  0014 05/08/20  1525    140   K 4.3 4.6    106   CO2 27 31   BUN 14 14   CREA 0.99 1.04   * 115*   PHOS 4.3  --    CA 8.4* 9.7     Recent Labs     05/09/20  0014 05/08/20  1525   SGOT 16 18   AP 76 100   TP 6.3* 8.3*   ALB 3.4* 4.6   GLOB 2.9 3.7   LPSE >3,000* >3,000*     No results for input(s): INR, PTP, APTT, INREXT in the last 72 hours.        Assessment: · Acute pancreatitis suspect from alcohol and less likely idiopatic, recurrent, the CT scan showed pancreatic calcifications, suggestive of acute on chronic pancreatitis  · Elevated WBC is secondary to pancreatitis     Patient Active Problem List   Diagnosis Code    Acute pancreatitis K85.90     Plan:   · Agree with current management  · No need for antibiotics  · NPO  · Monitor labs  · Counseled on alcohol cessation   · Will follow     Signed By: Linda Bennett MD     5/9/2020  11:47 AM

## 2020-05-09 NOTE — PROGRESS NOTES
Patient started vomiting. RN paged hospitalist, Amos Phipps NP, who ordered Zofran IV 4mg prn. RN will administer med and re-assess patient.

## 2020-05-09 NOTE — PROGRESS NOTES
Bedside shift change report given to Arlette Swanson RN (oncoming nurse) by Sp Wiggins RN (offgoing nurse). Report included the following information SBAR, Kardex, Intake/Output, MAR and Recent Results.

## 2020-05-09 NOTE — PROGRESS NOTES
[facility] [facility]  Hospitalist Group                                                                                          Hospitalist Progress Note  Kelsie Shahid MD  Answering service: [phone] from in house phone        Date of Service:  2020  NAME:  Andre Mata  :  1985  MRN:  [id number]    This documentation was facilitated by a Voice Recognition software and may contain inadvertent typographical errors. Admission Summary: This is a 30-year-old  male who presented to the ED on  for abdominal pain, nausea and vomiting. His past medical history significant for alcoholic pancreatitis. Interval history / Subjective:     Patient seen and examined this morning. Pain is fairly well controlled with intravenous opioids. He denied nausea or vomiting presently. Patient is admitted and treated here for the same and the summer 2019. Assessment & Plan:     Acute pancreatitis most probably due to alcohol  -CAT scan of the abdomen showed acute pancreatitis with no identified complication  -Keep n.p.o.  -Supportive therapy with IV fluids, analgesics. -GI consulted and following. Agree with GI recommendations of no need for antibiotics, I have discontinued the Flagyl and ceftriaxone that was started on admission. Dehydration due to the acute pancreatitis: Continue IV fluids. Patient's n.p.o. for now    Leukocytosis which is reactive due to the pancreatitis, already improving.  -Agree with GI recommendations of no need for antibiotics, I have discontinued the Flagyl and ceftriaxone that was started on admission. Alcohol abuse  -Counseled extensively to totally abstain. Discussed significant health risks of excessive alcohol one of which is pancreatitis he is now suffering recurrently. He seems motivated. -drinks daily. Denied h/o etoh withdrawal  -symptom trigger rx of possible etoh withdrawal per CIWA  Code status: Full code  DVT prophylaxis: Heparin    Care Plan discussed with: Patient/Family and Nurse  Anticipated Disposition: Home w/Family  Anticipated Discharge: 24 hours to 48 hours     Hospital Problems  Never Reviewed          Codes Class Noted POA    Acute pancreatitis ICD-10-CM: K85.90  ICD-9-CM: 809.4  7/19/2019 Unknown                Review of Systems:   A comprehensive review of systems was negative except for that written in the HPI. Vital Signs:    Last 24hrs VS reviewed since prior progress note. Most recent are:  Visit Vitals  /76   Pulse 88   Temp 98.4 °F (36.9 °C)   Resp 18   Ht 5' 10\" (1.778 m)   Wt 74.8 kg (165 lb)   SpO2 98%   BMI 23.68 kg/m²         Intake/Output Summary (Last 24 hours) at 5/9/2020 1514  Last data filed at 5/9/2020 0602  Gross per 24 hour   Intake    Output 350 ml   Net -350 ml        Physical Examination:             Constitutional:  No acute distress, cooperative, pleasant    HEENT:  Atraumatic. Oral mucosa moist,. Non icteric sclera. No pallor. Resp:  CTA bilaterally. No wheezing/rhonchi/rales. No accessory muscle use   Chest Wall: No deformity   CV:  Regular rhythm, normal rate, no murmurs, gallops, rubs    GI:  Abdomen is not distended. Normoactive bowel sounds. Diffuse upper quadrant tenderness without rebound or guarding.    :  No CVA or suprapubic tenderness    Musculoskeletal:  No edema, warm, 2+ pulses throughout    Neurologic:  Mental status:AAOx3,   Cranial nerves II-XII : WNL  Motor exam:Moves all extremities symmetrically              Data Review:    Review and/or order of clinical lab test  Review and/or order of tests in the radiology section of CPT  Review and/or order of tests in the medicine section of CPT      Labs:     Recent Labs     05/09/20  0014 05/08/20  1525   WBC 11.2* 14.0*   HGB 14.0 16.1   HCT 43.0 49.0    210     Recent Labs     05/09/20  0014 05/08/20  1525    140   K 4.3 4.6    106   CO2 27 31   BUN 14 14   CREA 0.99 1.04   * 115*   CA 8.4* 9.7   MG 2.1  --    PHOS 4.3  --      Recent Labs     05/09/20  0014 05/08/20  1525   SGOT 16 18   ALT 20 28   AP 76 100   TBILI 0.8 0.6   TP 6.3* 8.3*   ALB 3.4* 4.6   GLOB 2.9 3.7   LPSE >3,000* >3,000*     No results for input(s): INR, PTP, APTT, INREXT in the last 72 hours. No results for input(s): FE, TIBC, PSAT, FERR in the last 72 hours. No results found for: FOL, RBCF   No results for input(s): PH, PCO2, PO2 in the last 72 hours. No results for input(s): CPK, CKNDX, TROIQ in the last 72 hours.     No lab exists for component: CPKMB  Lab Results   Component Value Date/Time    Cholesterol, total 175 07/18/2019 10:13 PM    HDL Cholesterol 66 07/18/2019 10:13 PM    LDL, calculated 101.8 (H) 07/18/2019 10:13 PM    Triglyceride 36 07/18/2019 10:13 PM    CHOL/HDL Ratio 2.7 07/18/2019 10:13 PM     No results found for: [facility]  Lab Results   Component Value Date/Time    Color YELLOW/STRAW 05/08/2020 03:25 PM    Appearance CLEAR 05/08/2020 03:25 PM    Specific gravity 1.010 05/08/2020 03:25 PM    pH (UA) 5.5 05/08/2020 03:25 PM    Protein TRACE (A) 05/08/2020 03:25 PM    Glucose Negative 05/08/2020 03:25 PM    Ketone TRACE (A) 05/08/2020 03:25 PM    Bilirubin Negative 05/08/2020 03:25 PM    Urobilinogen 0.2 05/08/2020 03:25 PM    Nitrites Negative 05/08/2020 03:25 PM    Leukocyte Esterase Negative 05/08/2020 03:25 PM    Epithelial cells FEW 05/08/2020 03:25 PM    Bacteria Negative 05/08/2020 03:25 PM    WBC 0-4 05/08/2020 03:25 PM    RBC 0-5 05/08/2020 03:25 PM         Medications Reviewed:     Current Facility-Administered Medications   Medication Dose Route Frequency    ondansetron (ZOFRAN) injection 4 mg  4 mg IntraVENous Q4H PRN    sodium chloride (NS) flush 5-40 mL  5-40 mL IntraVENous Q8H    sodium chloride (NS) flush 5-40 mL  5-40 mL IntraVENous PRN    HYDROmorphone (PF) (DILAUDID) injection 1 mg  1 mg IntraVENous Q3H PRN    cefTRIAXone (ROCEPHIN) 1 g in 0.9% sodium chloride (MBP/ADV) 50 mL  1 g IntraVENous Q24H    metroNIDAZOLE (FLAGYL) IVPB premix 500 mg  500 mg IntraVENous Q12H    heparin (porcine) injection 5,000 Units  5,000 Units SubCUTAneous Q8H    lactated Ringers infusion  150 mL/hr IntraVENous CONTINUOUS     ______________________________________________________________________  EXPECTED LENGTH OF STAY: - - -  ACTUAL LENGTH OF STAY:          1                 Cathy Belcher MD

## 2020-05-10 LAB
BASOPHILS # BLD: 0 K/UL (ref 0–0.1)
BASOPHILS NFR BLD: 0 % (ref 0–1)
DIFFERENTIAL METHOD BLD: NORMAL
EOSINOPHIL # BLD: 0.1 K/UL (ref 0–0.4)
EOSINOPHIL NFR BLD: 1 % (ref 0–7)
ERYTHROCYTE [DISTWIDTH] IN BLOOD BY AUTOMATED COUNT: 12.1 % (ref 11.5–14.5)
HCT VFR BLD AUTO: 40.3 % (ref 36.6–50.3)
HGB BLD-MCNC: 13.8 G/DL (ref 12.1–17)
IMM GRANULOCYTES # BLD AUTO: 0 K/UL (ref 0–0.04)
IMM GRANULOCYTES NFR BLD AUTO: 0 % (ref 0–0.5)
LIPASE SERPL-CCNC: >3000 U/L (ref 73–393)
LYMPHOCYTES # BLD: 1.7 K/UL (ref 0.8–3.5)
LYMPHOCYTES NFR BLD: 16 % (ref 12–49)
MCH RBC QN AUTO: 30.1 PG (ref 26–34)
MCHC RBC AUTO-ENTMCNC: 34.2 G/DL (ref 30–36.5)
MCV RBC AUTO: 87.8 FL (ref 80–99)
MONOCYTES # BLD: 1 K/UL (ref 0–1)
MONOCYTES NFR BLD: 9 % (ref 5–13)
NEUTS SEG # BLD: 7.8 K/UL (ref 1.8–8)
NEUTS SEG NFR BLD: 74 % (ref 32–75)
NRBC # BLD: 0 K/UL (ref 0–0.01)
NRBC BLD-RTO: 0 PER 100 WBC
PLATELET # BLD AUTO: 164 K/UL (ref 150–400)
PMV BLD AUTO: 11 FL (ref 8.9–12.9)
RBC # BLD AUTO: 4.59 M/UL (ref 4.1–5.7)
WBC # BLD AUTO: 10.6 K/UL (ref 4.1–11.1)

## 2020-05-10 PROCEDURE — 74011000250 HC RX REV CODE- 250: Performed by: HOSPITALIST

## 2020-05-10 PROCEDURE — 65270000032 HC RM SEMIPRIVATE

## 2020-05-10 PROCEDURE — 74011250636 HC RX REV CODE- 250/636: Performed by: HOSPITALIST

## 2020-05-10 PROCEDURE — 74011250636 HC RX REV CODE- 250/636: Performed by: FAMILY MEDICINE

## 2020-05-10 PROCEDURE — 85025 COMPLETE CBC W/AUTO DIFF WBC: CPT

## 2020-05-10 PROCEDURE — 36415 COLL VENOUS BLD VENIPUNCTURE: CPT

## 2020-05-10 PROCEDURE — 94760 N-INVAS EAR/PLS OXIMETRY 1: CPT

## 2020-05-10 PROCEDURE — 83690 ASSAY OF LIPASE: CPT

## 2020-05-10 RX ADMIN — Medication 10 ML: at 22:06

## 2020-05-10 RX ADMIN — FOLIC ACID: 5 INJECTION, SOLUTION INTRAMUSCULAR; INTRAVENOUS; SUBCUTANEOUS at 15:00

## 2020-05-10 RX ADMIN — SODIUM CHLORIDE, SODIUM LACTATE, POTASSIUM CHLORIDE, AND CALCIUM CHLORIDE 150 ML/HR: 600; 310; 30; 20 INJECTION, SOLUTION INTRAVENOUS at 23:00

## 2020-05-10 NOTE — PROGRESS NOTES
Bedside shift change report given to Palmira Rodriguez RN (oncoming nurse) by Brittani Lee RN (offgoing nurse). Report included the following information SBAR, Kardex, Intake/Output, MAR and Recent Results.

## 2020-05-10 NOTE — PROGRESS NOTES
[address] [facility] Adult  Hospitalist Group                                                                                          Hospitalist Progress Note  Jarad Lennon, MD  Answering service: [phone] from in house phone        Date of Service:  5/10/2020  NAME:  Paty Alaniz  :  1985  MRN:  [id number]    This documentation was facilitated by a Voice Recognition software and may contain inadvertent typographical errors. Admission Summary: This is a 28-year-old  male who presented to the ED on  for abdominal pain, nausea and vomiting. His past medical history significant for alcoholic pancreatitis. Interval history / Subjective:   Patient seen earlier this morning. Abdominal pain is minimal, no nausea or vomiting. 422     Assessment & Plan:      Acute pancreatitis most probably due to alcohol  -CAT scan of the abdomen showed acute pancreatitis with no identified complication  -Keep n.p.o.  -Supportive therapy with IV fluids, analgesics. -GI following.  -No need for antibiotics presently. Dehydration due to the acute pancreatitis: Continue IV fluids. Patient's n.p.o. for now    Leukocytosis which is reactive due to the pancreatitis, already improving.  -Agree with GI recommendations of no need for antibiotics, I have discontinued the Flagyl and ceftriaxone that was started on admission. -WBC normalized    Alcohol abuse without evidence of withdrawal thus far. -Counseled extensively to totally abstain. Discussed significant health risks of excessive alcohol one of which is pancreatitis he is now suffering recurrently. He seems motivated. -drinks daily. Denied h/o etoh withdrawal  -symptom trigger rx of possible etoh withdrawal per CIWA  Code status: Full code  DVT prophylaxis: Heparin    Care Plan discussed with: Patient/Family and Nurse  Anticipated Disposition: Home w/Family  Anticipated Discharge: 24 hours to 48 hours     Hospital Problems  Never Reviewed          Codes Class Noted POA    Acute pancreatitis ICD-10-CM: K85.90  ICD-9-CM: 178.0  7/19/2019 Unknown                Review of Systems:   A comprehensive review of systems was negative except for that written in the HPI. Vital Signs:    Last 24hrs VS reviewed since prior progress note. Most recent are:  Visit Vitals  /76   Pulse 77   Temp 98 °F (36.7 °C)   Resp 18   Ht 5' 10\" (1.778 m)   Wt 74.8 kg (165 lb)   SpO2 100%   BMI 23.68 kg/m²         Intake/Output Summary (Last 24 hours) at 5/10/2020 1538  Last data filed at 5/10/2020 1504  Gross per 24 hour   Intake    Output 2050 ml   Net -2050 ml        Physical Examination:             Constitutional:  No acute distress, cooperative, pleasant    HEENT:  Atraumatic. Oral mucosa moist,. Non icteric sclera. No pallor. Resp:  CTA bilaterally. No wheezing/rhonchi/rales. No accessory muscle use   Chest Wall: No deformity   CV:  Regular rhythm, normal rate, no murmurs, gallops, rubs    GI:  Abdomen is not distended. Normoactive bowel sounds. Diffuse upper quadrant tenderness without rebound or guarding.    :  No CVA or suprapubic tenderness    Musculoskeletal:  No edema, warm, 2+ pulses throughout    Neurologic:  Mental status:AAOx3,   Cranial nerves II-XII : WNL  Motor exam:Moves all extremities symmetrically              Data Review:    Review and/or order of clinical lab test  Review and/or order of tests in the radiology section of CPT  Review and/or order of tests in the medicine section of CPT      Labs:     Recent Labs     05/10/20  0121 05/09/20  0014   WBC 10.6 11.2*   HGB 13.8 14.0   HCT 40.3 43.0    179     Recent Labs     05/09/20  0014 05/08/20  1525    140   K 4.3 4.6    106   CO2 27 31   BUN 14 14   CREA 0.99 1.04   * 115*   CA 8.4* 9.7   MG 2.1  --    PHOS 4.3  --      Recent Labs     05/10/20  0121 05/09/20  0014 05/08/20  1525   SGOT  --  16 18   ALT  --  20 28   AP  --  76 100   TBILI  --  0.8 0.6   TP --  6.3* 8.3*   ALB  --  3.4* 4.6   GLOB  --  2.9 3.7   LPSE >3,000* >3,000* >3,000*     No results for input(s): INR, PTP, APTT, INREXT, INREXT in the last 72 hours. No results for input(s): FE, TIBC, PSAT, FERR in the last 72 hours. No results found for: FOL, RBCF   No results for input(s): PH, PCO2, PO2 in the last 72 hours. No results for input(s): CPK, CKNDX, TROIQ in the last 72 hours. No lab exists for component: CPKMB  Lab Results   Component Value Date/Time    Cholesterol, total 175 07/18/2019 10:13 PM    HDL Cholesterol 66 07/18/2019 10:13 PM    LDL, calculated 101.8 (H) 07/18/2019 10:13 PM    Triglyceride 36 07/18/2019 10:13 PM    CHOL/HDL Ratio 2.7 07/18/2019 10:13 PM     No results found for: [facility]  Lab Results   Component Value Date/Time    Color YELLOW/STRAW 05/08/2020 03:25 PM    Appearance CLEAR 05/08/2020 03:25 PM    Specific gravity 1.010 05/08/2020 03:25 PM    pH (UA) 5.5 05/08/2020 03:25 PM    Protein TRACE (A) 05/08/2020 03:25 PM    Glucose Negative 05/08/2020 03:25 PM    Ketone TRACE (A) 05/08/2020 03:25 PM    Bilirubin Negative 05/08/2020 03:25 PM    Urobilinogen 0.2 05/08/2020 03:25 PM    Nitrites Negative 05/08/2020 03:25 PM    Leukocyte Esterase Negative 05/08/2020 03:25 PM    Epithelial cells FEW 05/08/2020 03:25 PM    Bacteria Negative 05/08/2020 03:25 PM    WBC 0-4 05/08/2020 03:25 PM    RBC 0-5 05/08/2020 03:25 PM         Medications Reviewed:     Current Facility-Administered Medications   Medication Dose Route Frequency    ondansetron (ZOFRAN) injection 4 mg  4 mg IntraVENous Q4H PRN    LORazepam (ATIVAN) injection 2 mg  2 mg IntraVENous Q1H PRN    LORazepam (ATIVAN) injection 4 mg  4 mg IntraVENous Q1H PRN    0.9% sodium chloride 1,000 mL with mvi, adult no. 4 with vit K 10 mL, thiamine 064 mg, folic acid 1 mg infusion   IntraVENous Q24H    sodium chloride (NS) flush 5-40 mL  5-40 mL IntraVENous Q8H    sodium chloride (NS) flush 5-40 mL  5-40 mL IntraVENous PRN    HYDROmorphone (PF) (DILAUDID) injection 1 mg  1 mg IntraVENous Q3H PRN    heparin (porcine) injection 5,000 Units  5,000 Units SubCUTAneous Q8H    lactated Ringers infusion  150 mL/hr IntraVENous CONTINUOUS     ______________________________________________________________________  EXPECTED LENGTH OF STAY: - - -  ACTUAL LENGTH OF STAY:          2                 Kelsie Shahid MD

## 2020-05-10 NOTE — PROGRESS NOTES
[address], [address] [place] [address]    GI PROGRESS NOTE    NAME: Jose Lombardo   :  1985   MRN:  [id number]       Subjective:     Feels better  Review of Systems    Constitutional: negative fever, negative chills, negative weight loss  Eyes:   negative visual changes  ENT:   negative sore throat, tongue or lip swelling  Respiratory:  negative cough, negative dyspnea  Cards:  negative for chest pain, palpitations, lower extremity edema  GI:   See HPI  :  negative for frequency, dysuria  Integument:  negative for rash and pruritus  Heme:  negative for easy bruising and gum/nose bleeding  Musculoskel: negative for myalgias,  back pain and muscle weakness  Neuro: negative for headaches, dizziness, vertigo  Psych:  negative for feelings of anxiety, depression         Objective:     VITALS:   Last 24hrs VS reviewed since prior progress note. Most recent are:  Visit Vitals  /76   Pulse 77   Temp 98 °F (36.7 °C)   Resp 18   Ht 5' 10\" (1.778 m)   Wt 74.8 kg (165 lb)   SpO2 100%   BMI 23.68 kg/m²       Intake/Output Summary (Last 24 hours) at 5/10/2020 1222  Last data filed at 5/10/2020 0824  Gross per 24 hour   Intake    Output 750 ml   Net -750 ml     PHYSICAL EXAM:  General: WD, WN. Alert, cooperative, no acute distress    HEENT: NC, Atraumatic. PERRLA, EOMI. Anicteric sclerae. Lungs:  CTA Bilaterally. No Wheezing/Rhonchi/Rales. Heart:  Regular  rhythm,  No murmur (), No Rubs, No Gallops  Abdomen: Soft, Non distended, Non tender.  +Bowel sounds, no HSM  Extremities: No c/c/e  Neurologic:  CN 2-12 gi, Alert and oriented X 3. No acute neurological distress   Psych:   Good insight. Not anxious nor agitated.     Lab Data Reviewed:   Recent Labs     05/10/20  0121 20  0014   WBC 10.6 11.2*   HGB 13.8 14.0   HCT 40.3 43.0    179     Recent Labs     20  0014 20  1525    140   K 4.3 4.6    106   CO2 27 31   BUN 14 14   CREA 0.99 1. 04   * 115*   PHOS 4.3  --    CA 8.4* 9.7     Recent Labs     05/10/20  0121 05/09/20  0014 05/08/20  1525   SGOT  --  16 18   AP  --  76 100   TP  --  6.3* 8.3*   ALB  --  3.4* 4.6   GLOB  --  2.9 3.7   LPSE >3,000* >3,000* >3,000*       ________________________________________________________________________       Assessment:   · Acute on chronic pancreatitis, secondary to alcohol  · Epigastric pain, improving     Patient Active Problem List   Diagnosis Code    Acute pancreatitis K85.90     Plan:   · NPO  · Pan control  · Dr Kathrin Zepeda will follow in am     Signed By: Glenn Motley MD     5/10/2020  12:22 PM

## 2020-05-11 LAB
ALBUMIN SERPL-MCNC: 3.4 G/DL (ref 3.5–5)
ALBUMIN/GLOB SERPL: 1 {RATIO} (ref 1.1–2.2)
ALP SERPL-CCNC: 76 U/L (ref 45–117)
ALT SERPL-CCNC: 22 U/L (ref 12–78)
ANION GAP SERPL CALC-SCNC: 8 MMOL/L (ref 5–15)
AST SERPL-CCNC: 20 U/L (ref 15–37)
BILIRUB SERPL-MCNC: 1 MG/DL (ref 0.2–1)
BUN SERPL-MCNC: 9 MG/DL (ref 6–20)
BUN/CREAT SERPL: 11 (ref 12–20)
CALCIUM SERPL-MCNC: 8.4 MG/DL (ref 8.5–10.1)
CHLORIDE SERPL-SCNC: 105 MMOL/L (ref 97–108)
CO2 SERPL-SCNC: 23 MMOL/L (ref 21–32)
CREAT SERPL-MCNC: 0.83 MG/DL (ref 0.7–1.3)
GLOBULIN SER CALC-MCNC: 3.5 G/DL (ref 2–4)
GLUCOSE SERPL-MCNC: 72 MG/DL (ref 65–100)
LIPASE SERPL-CCNC: 1302 U/L (ref 73–393)
POTASSIUM SERPL-SCNC: 3.9 MMOL/L (ref 3.5–5.1)
PROT SERPL-MCNC: 6.9 G/DL (ref 6.4–8.2)
SODIUM SERPL-SCNC: 136 MMOL/L (ref 136–145)

## 2020-05-11 PROCEDURE — 74011250636 HC RX REV CODE- 250/636: Performed by: HOSPITALIST

## 2020-05-11 PROCEDURE — 74011000250 HC RX REV CODE- 250: Performed by: HOSPITALIST

## 2020-05-11 PROCEDURE — 65270000032 HC RM SEMIPRIVATE

## 2020-05-11 PROCEDURE — 74011250636 HC RX REV CODE- 250/636: Performed by: FAMILY MEDICINE

## 2020-05-11 PROCEDURE — 94760 N-INVAS EAR/PLS OXIMETRY 1: CPT

## 2020-05-11 PROCEDURE — 36415 COLL VENOUS BLD VENIPUNCTURE: CPT

## 2020-05-11 PROCEDURE — 80053 COMPREHEN METABOLIC PANEL: CPT

## 2020-05-11 PROCEDURE — 83690 ASSAY OF LIPASE: CPT

## 2020-05-11 RX ADMIN — Medication 10 ML: at 07:00

## 2020-05-11 RX ADMIN — Medication 10 ML: at 20:00

## 2020-05-11 RX ADMIN — SODIUM CHLORIDE, SODIUM LACTATE, POTASSIUM CHLORIDE, AND CALCIUM CHLORIDE 150 ML/HR: 600; 310; 30; 20 INJECTION, SOLUTION INTRAVENOUS at 08:14

## 2020-05-11 RX ADMIN — SODIUM CHLORIDE, SODIUM LACTATE, POTASSIUM CHLORIDE, AND CALCIUM CHLORIDE 150 ML/HR: 600; 310; 30; 20 INJECTION, SOLUTION INTRAVENOUS at 01:27

## 2020-05-11 RX ADMIN — FOLIC ACID: 5 INJECTION, SOLUTION INTRAMUSCULAR; INTRAVENOUS; SUBCUTANEOUS at 16:24

## 2020-05-11 NOTE — PROGRESS NOTES
Patient denied any severe pain today, required no prn medications. Tolerated ice chips well and ambulating independently. Bedside shift change report given to Jluis Plaza (oncoming nurse) by Carissa GARCIA (offgoing nurse). Report included the following information SBAR, Kardex, Intake/Output and MAR.

## 2020-05-11 NOTE — PROGRESS NOTES
[address]  [address] [facility] 140 [facility], [address]  [phone]                GI PROGRESS NOTE      NAME:   Ariadna Jesus   :    1985   MRN:    [id number]     Assessment/Plan   1. Acute pancreatitis- resolving, lipase 1,302 this morning. Repeat lipase in AM, continue to advance diet as tolerated. Advanced to clear liquids this morning. CBC normal with normal WBC of 10. 6. pain   -Abdominal pain resolved. Patient Active Problem List   Diagnosis Code    Acute pancreatitis K85.90       Subjective:     Ariadna Jesus is a 36 y.o.  male. Patient stated he is feeling much better. No nausea, no vomiting, no abdominal pain. Very mild upper abdominal tenderness. Just started clear diet and tolerating this far. No issues with voiding. Stated bowel movement this morning that was loose to formed. No melena and no bright red blood    Review of Systems    Constitutional: negative fever, negative chills, negative weight loss  Eyes:   negative visual changes  ENT:   negative sore throat, tongue or lip swelling  Respiratory:  negative cough, negative dyspnea  Cards:  negative for chest pain, palpitations, lower extremity edema  GI:   See HPI  :  negative for frequency, dysuria  Integument:  negative for rash and pruritus  Heme:  negative for easy bruising and gum/nose bleeding  Musculoskel: negative for myalgias,  back pain and muscle weakness  Neuro: negative for headaches, dizziness, vertigo  Psych:  negative for feelings of anxiety, depression           Objective:     VITALS:   Last 24hrs VS reviewed since prior hospitalist progress note.  Most recent are:  Visit Vitals  /84 (BP 1 Location: Right arm, BP Patient Position: At rest)   Pulse 84   Temp 99.1 °F (37.3 °C)   Resp 20   Ht 5' 10\" (1.778 m)   Wt 74.8 kg (165 lb)   SpO2 98%   BMI 23.68 kg/m²       Intake/Output Summary (Last 24 hours) at 2020 1140  Last data filed at 5/10/2020 1850  Gross per 24 hour   Intake  Output 900 ml   Net -900 ml        PHYSICAL EXAM:  General   well developed, well nourished, appears stated age, in no acute distress  EENT  Normocephalic, Atraumatic, PERRLA, EOMI, sclera clear  Respiratory   Clear To Auscultation bilaterally - no wheezes, rales, rhonchi, or crackles  Cardiology  Regular Rate and Rythmn  - no murmurs, rubs or gallops  Abdominal  Soft, minimal tenderness with palpation to upper abdomen, non-distended,bowel sounds present  Extremities  No clubbing, cyanosis, or edema. Pulses intact. Back  No spinal or muscle pain. No CVAT. Skin  Normal skin turgor. No rashes or skin ulcers noted  Neurological  No focal neurological deficits noted  Psychological  Oriented x 3. Normal affect. Lab Data   Recent Results (from the past 12 hour(s))   METABOLIC PANEL, COMPREHENSIVE    Collection Time: 05/11/20  8:08 AM   Result Value Ref Range    Sodium 136 136 - 145 mmol/L    Potassium 3.9 3.5 - 5.1 mmol/L    Chloride 105 97 - 108 mmol/L    CO2 23 21 - 32 mmol/L    Anion gap 8 5 - 15 mmol/L    Glucose 72 65 - 100 mg/dL    BUN 9 6 - 20 MG/DL    Creatinine 0.83 0.70 - 1.30 MG/DL    BUN/Creatinine ratio 11 (L) 12 - 20      GFR est AA >60 >60 ml/min/1.73m2    GFR est non-AA >60 >60 ml/min/1.73m2    Calcium 8.4 (L) 8.5 - 10.1 MG/DL    Bilirubin, total 1.0 0.2 - 1.0 MG/DL    ALT (SGPT) 22 12 - 78 U/L    AST (SGOT) 20 15 - 37 U/L    Alk.  phosphatase 76 45 - 117 U/L    Protein, total 6.9 6.4 - 8.2 g/dL    Albumin 3.4 (L) 3.5 - 5.0 g/dL    Globulin 3.5 2.0 - 4.0 g/dL    A-G Ratio 1.0 (L) 1.1 - 2.2     LIPASE    Collection Time: 05/11/20  8:08 AM   Result Value Ref Range    Lipase 1,302 (H) 73 - 393 U/L         Medications: Reviewed      Date/Time:  5/11/2020

## 2020-05-11 NOTE — PROGRESS NOTES
[address] [facility] Adult  Hospitalist Group                                                                                          Hospitalist Progress Note  Marleni Siddiqi MD  Answering service: [phone] from in house phone        Date of Service:  2020  NAME:  Everett Meraz  :  1985  MRN:  [id number]    This documentation was facilitated by a Voice Recognition software and may contain inadvertent typographical errors. Admission Summary: This is a 36-year-old  male who presented to the ED on  for abdominal pain, nausea and vomiting. His past medical history significant for alcoholic pancreatitis. Interval history / Subjective:      Patient does not have any abdominal pain, nausea or vomiting. Has been more than 36 hours since he had any pain medicine. His lipase is coming down, will restart him on clear liquid diet and advance as needed  Assessment & Plan:      Acute pancreatitis most probably due to alcohol  -CAT scan of the abdomen showed acute pancreatitis with no identified complication  -Supportive therapy with IV fluids, analgesics. -Lipase is coming down, no abdominal pain.  -Start clear liquids and advance as tolerated  -GI following    Dehydration due to the acute pancreatitis: Continue IV fluids. Patient's n.p.o. for now    Leukocytosis which is reactive due to the pancreatitis, already improving.  -Agree with GI recommendations of no need for antibiotics, I have discontinued the Flagyl and ceftriaxone that was started on admission. -WBC normalized    Alcohol abuse without evidence of withdrawal thus far. -Counseled extensively to totally abstain. Discussed significant health risks of excessive alcohol one of which is pancreatitis he is now suffering recurrently. He seems motivated. -drinks daily. Denied h/o etoh withdrawal  -symptom trigger rx of possible etoh withdrawal per CIWA  Code status: Full code  DVT prophylaxis: Heparin    Care Plan discussed with: Patient/Family and Nurse  Anticipated Disposition: Home w/Family  Anticipated Discharge: [address] [facility] Problems  Never Reviewed          Codes Class Noted POA    Acute pancreatitis ICD-10-CM: K85.90  ICD-9-CM: 921.8  7/19/2019 Unknown                Review of Systems:   A comprehensive review of systems was negative except for that written in the HPI. Vital Signs:    Last 24hrs VS reviewed since prior progress note. Most recent are:  Visit Vitals  /84 (BP 1 Location: Right arm, BP Patient Position: At rest)   Pulse 84   Temp 99.1 °F (37.3 °C)   Resp 20   Ht 5' 10\" (1.778 m)   Wt 74.8 kg (165 lb)   SpO2 98%   BMI 23.68 kg/m²         Intake/Output Summary (Last 24 hours) at 5/11/2020 1339  Last data filed at 5/10/2020 1850  Gross per 24 hour   Intake    Output 900 ml   Net -900 ml        Physical Examination:             Constitutional:  No acute distress, cooperative, pleasant    HEENT:  Atraumatic. Oral mucosa moist,. Non icteric sclera. No pallor. Resp:  CTA bilaterally. No wheezing/rhonchi/rales. No accessory muscle use   Chest Wall: No deformity   CV:  Regular rhythm, normal rate, no murmurs, gallops, rubs    GI:  Abdomen is not distended. Normoactive bowel sounds. Diffuse upper quadrant tenderness without rebound or guarding.    :  No CVA or suprapubic tenderness    Musculoskeletal:  No edema, warm, 2+ pulses throughout    Neurologic:  Mental status:AAOx3,   Cranial nerves II-XII : WNL  Motor exam:Moves all extremities symmetrically              Data Review:    Review and/or order of clinical lab test  Review and/or order of tests in the radiology section of CPT  Review and/or order of tests in the medicine section of CPT      Labs:     Recent Labs     05/10/20  0121 05/09/20  0014   WBC 10.6 11.2*   HGB 13.8 14.0   HCT 40.3 43.0    179     Recent Labs     05/11/20  0808 05/09/20  0014 05/08/20  1525    139 140   K 3.9 4.3 4.6    108 106   CO2 23 27 31   BUN 9 14 14   CREA 0.83 0.99 1.04   GLU 72 120* 115*   CA 8.4* 8.4* 9.7   MG  --  2.1  --    PHOS  --  4.3  --      Recent Labs     05/11/20  0808 05/10/20  0121 05/09/20  0014 05/08/20  1525   SGOT 20  --  16 18   ALT 22  --  20 28   AP 76  --  76 100   TBILI 1.0  --  0.8 0.6   TP 6.9  --  6.3* 8.3*   ALB 3.4*  --  3.4* 4.6   GLOB 3.5  --  2.9 3.7   LPSE 1,302* >3,000* >3,000* >3,000*     No results for input(s): INR, PTP, APTT, INREXT, INREXT in the last 72 hours. No results for input(s): FE, TIBC, PSAT, FERR in the last 72 hours. No results found for: FOL, RBCF   No results for input(s): PH, PCO2, PO2 in the last 72 hours. No results for input(s): CPK, CKNDX, TROIQ in the last 72 hours.     No lab exists for component: CPKMB  Lab Results   Component Value Date/Time    Cholesterol, total 175 07/18/2019 10:13 PM    HDL Cholesterol 66 07/18/2019 10:13 PM    LDL, calculated 101.8 (H) 07/18/2019 10:13 PM    Triglyceride 36 07/18/2019 10:13 PM    CHOL/HDL Ratio 2.7 07/18/2019 10:13 PM     No results found for: [facility]  Lab Results   Component Value Date/Time    Color YELLOW/STRAW 05/08/2020 03:25 PM    Appearance CLEAR 05/08/2020 03:25 PM    Specific gravity 1.010 05/08/2020 03:25 PM    pH (UA) 5.5 05/08/2020 03:25 PM    Protein TRACE (A) 05/08/2020 03:25 PM    Glucose Negative 05/08/2020 03:25 PM    Ketone TRACE (A) 05/08/2020 03:25 PM    Bilirubin Negative 05/08/2020 03:25 PM    Urobilinogen 0.2 05/08/2020 03:25 PM    Nitrites Negative 05/08/2020 03:25 PM    Leukocyte Esterase Negative 05/08/2020 03:25 PM    Epithelial cells FEW 05/08/2020 03:25 PM    Bacteria Negative 05/08/2020 03:25 PM    WBC 0-4 05/08/2020 03:25 PM    RBC 0-5 05/08/2020 03:25 PM         Medications Reviewed:     Current Facility-Administered Medications   Medication Dose Route Frequency    ondansetron (ZOFRAN) injection 4 mg  4 mg IntraVENous Q4H PRN    LORazepam (ATIVAN) injection 2 mg  2 mg IntraVENous Q1H PRN    LORazepam (ATIVAN) injection 4 mg  4 mg IntraVENous Q1H PRN    0.9% sodium chloride 1,000 mL with mvi, adult no. 4 with vit K 10 mL, thiamine 535 mg, folic acid 1 mg infusion   IntraVENous Q24H    sodium chloride (NS) flush 5-40 mL  5-40 mL IntraVENous Q8H    sodium chloride (NS) flush 5-40 mL  5-40 mL IntraVENous PRN    HYDROmorphone (PF) (DILAUDID) injection 1 mg  1 mg IntraVENous Q3H PRN    heparin (porcine) injection 5,000 Units  5,000 Units SubCUTAneous Q8H    lactated Ringers infusion  150 mL/hr IntraVENous CONTINUOUS     ______________________________________________________________________  EXPECTED LENGTH OF STAY: 2d 12h  ACTUAL LENGTH OF STAY:          3                 Jose D Evans MD

## 2020-05-11 NOTE — PROGRESS NOTES
VIBHA:  1. Return home when stable. Care Management Interventions  PCP Verified by CM: Yes  Palliative Care Criteria Met (RRAT>21 & CHF Dx)?: No  Mode of Transport at Discharge: Other (see comment)  MyChart Signup: No  Discharge Durable Medical Equipment: No  Health Maintenance Reviewed: Yes  Physical Therapy Consult: No  Occupational Therapy Consult: No  Speech Therapy Consult: No  Current Support Network: Family Lives Nearby  Confirm Follow Up Transport: Family  The Plan for Transition of Care is Related to the Following Treatment Goals : Return home when stable. The Patient and/or Patient Representative was Provided with a Choice of Provider and Agrees with the Discharge Plan?: Yes  [place] Resource Information Provided?: No  Discharge Location  Discharge Placement: Home with family assistance    Reason for Admission:   Recurrent pancreatitis                    RUR Score:          7%           Plan for utilizing home health:      No indication at this time. PCP: First and Last name:     Name of Practice:    Are you a current patient: Yes/No:    Approximate date of last visit:                     Current Advanced Directive/Advance Care Plan:  Not on file. Transition of Care Plan:                    Reviewed chart for transitions of care,and discussed in rounds. CM met with patient at bedside to explain role and offer support. Patient is alert and oriented x4, and confirmed demographics. He lives in an apartment, and is independent with ADLs and IADLs prior to admission. He is requesting for PCP, and cm will provide him with who is accepting new patients. He will return home when stable. There are no other discharge needs at this time.     [facility]

## 2020-05-11 NOTE — PROGRESS NOTES
Bedside and Verbal shift change report given to Kasi Rivas RN (oncoming nurse) by Nancy Garibay RN (offgoing nurse). Report included the following information SBAR, Kardex, ED Summary, Intake/Output, MAR and Recent Results.

## 2020-05-12 VITALS
DIASTOLIC BLOOD PRESSURE: 70 MMHG | OXYGEN SATURATION: 98 % | HEART RATE: 63 BPM | SYSTOLIC BLOOD PRESSURE: 149 MMHG | TEMPERATURE: 97.9 F | RESPIRATION RATE: 16 BRPM | HEIGHT: 70 IN | WEIGHT: 165 LBS | BODY MASS INDEX: 23.62 KG/M2

## 2020-05-12 LAB — LIPASE SERPL-CCNC: 794 U/L (ref 73–393)

## 2020-05-12 PROCEDURE — 36415 COLL VENOUS BLD VENIPUNCTURE: CPT

## 2020-05-12 PROCEDURE — 83690 ASSAY OF LIPASE: CPT

## 2020-05-12 NOTE — DISCHARGE INSTRUCTIONS
Patient Education        Pancreatitis: Care Instructions  Your Care Instructions    The pancreas is an organ behind the stomach. It makes hormones and enzymes to help your body digest food. But if these enzymes attack the pancreas, it can get inflamed. This is called pancreatitis. Most cases are caused by gallstones or by heavy alcohol use. If you take care of yourself at home, it will help you get better. It will also help you avoid more problems with your pancreas. Follow-up care is a key part of your treatment and safety. Be sure to make and go to all appointments, and call your doctor if you are having problems. It's also a good idea to know your test results and keep a list of the medicines you take. How can you care for yourself at home? · Drink clear liquids and eat bland foods until you feel better. [place] foods include rice, dry toast, and crackers. They also include bananas and applesauce. · Eat a low-fat diet until your doctor says your pancreas is healed. · Do not drink alcohol. Tell your doctor if you need help to quit. Counseling, support groups, and sometimes medicines can help you stay sober. · Be safe with medicines. Read and follow all instructions on the label. ? If the doctor gave you a prescription medicine for pain, take it as prescribed. ? If you are not taking a prescription pain medicine, ask your doctor if you can take an over-the-counter medicine. · If your doctor prescribed antibiotics, take them as directed. Do not stop taking them just because you feel better. You need to take the full course of antibiotics. · Get extra rest until you feel better. To prevent future problems with your pancreas  · Do not drink alcohol. · Tell your doctors and pharmacist that you've had pancreatitis. They can help you avoid medicines that may cause this problem again. When should you call for help? Call 911 anytime you think you may need emergency care.  For example, call if:    · You vomit blood or what looks like coffee grounds.     · Your stools are maroon or very bloody.    Call your doctor now or seek immediate medical care if:    · You have new or worse belly pain.     · Your stools are black and look like tar, or they have streaks of blood.     · You are vomiting.    Watch closely for changes in your health, and be sure to contact your doctor if:    · You do not get better as expected. Where can you learn more? Go to http://tyree-alida.info/  Enter J381 in the search box to learn more about \"Pancreatitis: Care Instructions. \"  Current as of: August 11, 2019Content Version: 12.4  © 6790-4233 [employer]. Care instructions adapted under license by [employer] (which disclaims liability or warranty for this information). If you have questions about a medical condition or this instruction, always ask your healthcare professional. [place] [address] any warranty or liability for your use of this information. Patient Education        Learning About Alcohol Use Disorder  What is alcohol use disorder? Alcohol use disorder means that a person drinks alcohol even though it causes harm to themselves or others. It can range from mild to severe. The more signs of this disorder you have, the more severe it may be. Moderate to severe alcohol use disorder is sometimes called addiction. People who have it may find it hard to control their use of alcohol. People who have this disorder may argue with others about how much they're drinking. Their job may be affected because of drinking. They may drink when it's dangerous or illegal, such as when they drive. They also may have a strong need, or craving, to drink. They may feel like they must drink just to get by. Their drinking may increase their risk of getting hurt or being in a car crash. Over time, drinking too much alcohol may cause health problems.  These may include high blood pressure, liver problems, or problems with digestion. What are the signs? Maybe you've wondered about your alcohol habits, or how to tell if your drinking is becoming a problem. Here are some of the signs of alcohol use disorder. You may have it if you have two or more of the following signs:  · You drink larger amounts of alcohol than you ever meant to. Or you've been drinking for a longer time than you ever meant to. · You can't cut down or control your use. Or you constantly wish you could cut down. · You spend a lot of time getting or drinking alcohol or recovering from its effects. · You have strong cravings for alcohol. · You can no longer do your main jobs at work, at school, or at home. · You keep drinking alcohol, even though your use hurts your relationships. · You have stopped doing important activities because of your alcohol use. · You drink alcohol in situations where doing so is dangerous. · You keep drinking alcohol even though you know it's causing health problems. · You need more and more alcohol to get the same effect, or you get less effect from the same amount over time. This is called tolerance. · You have uncomfortable symptoms when you stop drinking alcohol or use less. This is called withdrawal.  Alcohol use disorder can range from mild to severe. The more signs you have, the more severe the disorder may be. Moderate to severe alcohol use disorder is sometimes called addiction. You might not realize that your drinking is a problem. You might not drink large amounts when you drink. Or you might go for days or weeks between drinking episodes. But even if you don't drink very often, your drinking could still be harmful and put you at risk. How is alcohol use disorder treated? Getting help is up to you. But you don't have to do it alone. There are many people and kinds of treatments that can help.   Treatment for alcohol use disorder can include:  · Group therapy, one or more types of counseling, and alcohol education. · Medicines that help to:  ? Reduce withdrawal symptoms and help you safely stop drinking. ? Reduce cravings for alcohol. · Support groups. These groups include Alcoholics Anonymous and [facility] (Self-Management and Recovery Training). Some people are able to stop or cut back on drinking with help from a counselor. People who have moderate to severe alcohol use disorder may need medical treatment. They may need to stay in a hospital or treatment center. You may have a treatment team to help you. This team may include a psychologist or psychiatrist, counselors, doctors, social workers, nurses, and a . A  helps plan and manage your treatment. Follow-up care is a key part of your treatment and safety. Be sure to make and go to all appointments, and call your doctor if you are having problems. It's also a good idea to know your test results and keep a list of the medicines you take. Where can you learn more? Go to http://tyreeRollbaralida.info/  Enter H758 in the search box to learn more about \"Learning About Alcohol Use Disorder. \"  Current as of: August 21, 2019Content Version: 12.4  © 1230-6662 [employer]. Care instructions adapted under license by [employer] (which disclaims liability or warranty for this information). If you have questions about a medical condition or this instruction, always ask your healthcare professional. [address] any warranty or liability for your use of this information. Discharge Instructions       PATIENT ID: Elmo Boyce  MRN: [id number]   YOB: 1985    DATE OF ADMISSION: 5/8/2020  3:07 PM    DATE OF DISCHARGE: 5/12/2020    PRIMARY CARE PROVIDER: None     ATTENDING PHYSICIAN: Roel Garcia MD  DISCHARGING PROVIDER: Margret Carter MD    To contact this individual call [phone] and ask the  to page.      If unavailable ask to be transferred the Adult Hospitalist Department. DISCHARGE DIAGNOSES and CARE RECOMMENDATIONS:   Acute pancreatitis due likely to alcohol. We strongly recommend you abstain from alcohol to prevent further complications   Follow up with gastroenterologist in 2-3 weeks         DIET: Regular Diet      ACTIVITY: Activity as tolerated      PENDING TEST RESULTS:   At the time of discharge the following test results are still pending: none    FOLLOW UP APPOINTMENTS:   Follow-up Information     Follow up With Specialties Details Why Contact Info    Saurabh Redd MD Gastroenterology  Call office to schedule appointment [address] [facility]  [address] [facility]  [phone]      None    None ([phone]) Patient stated that they have no PCP               YOU WERE SEEN BY THE FOLLOWING CONSULTATIONS:   IP CONSULT TO GASTROENTEROLOGY    YOU HAD THE FOLLOWING PROCEDURES/SURGERIES  :   * No surgery found *              DISCHARGE MEDICATIONS:   See Medication Reconciliation Form    · It is important that you take the medication exactly as they are prescribed. · Keep your medication in the bottles provided by the pharmacist and keep a list of the medication names, dosages, and times to be taken in your wallet. · Do not take other medications without consulting your doctor. NOTIFY YOUR PHYSICIAN FOR ANY OF THE FOLLOWING:   Fever over 101 degrees for 24 hours. Chest pain, shortness of breath, fever, chills, nausea, vomiting, diarrhea, change in mentation, falling, weakness, bleeding. Severe pain or pain not relieved by medications. Or, any other signs or symptoms that you may have questions about. Signed:    Andrey Dias MD  5/12/2020  2:46 PM

## 2020-05-12 NOTE — PROGRESS NOTES
[address]  [address] [place]  [place], [address]  [phone]                GI PROGRESS NOTE      NAME:   Everett Meraz   :    1985   MRN:    [id number]     Assessment/Plan   1. Acute pancreatitis- resolving, lipase down to 794 this morning. Abdominal pain resolved, no pain medication in use in last 2 days. Discussed with hospitalist. Patient may be discharged home with outpatient follow up in 2-3 weeks. Will discuss with Dr. You Bermudez     Patient Active Problem List   Diagnosis Code    Acute pancreatitis K85.90       Subjective:     Everett Meraz is a 36 y.o.  male. Patient wanting to go home. Patient started GI Lite diet, ate breakfast this morning, and tolerating well. Denies any nausea, no vomiting, no reflux/heartburn, and no abdominal pain. Ambulating. Having bowel movements that are loose to formed. Denies any blood in stool. Review of Systems    Constitutional: negative fever, negative chills, negative weight loss  Eyes:   negative visual changes  ENT:   negative sore throat, tongue or lip swelling  Respiratory:  negative cough, negative dyspnea  Cards:  negative for chest pain, palpitations, lower extremity edema  GI:   See HPI  :  negative for frequency, dysuria  Integument:  negative for rash and pruritus  Heme:  negative for easy bruising and gum/nose bleeding  Musculoskel: negative for myalgias,  back pain and muscle weakness  Neuro: negative for headaches, dizziness, vertigo  Psych:  negative for feelings of anxiety, depression           Objective:     VITALS:   Last 24hrs VS reviewed since prior hospitalist progress note.  Most recent are:  Visit Vitals  /70   Pulse 63   Temp 97.9 °F (36.6 °C)   Resp 16   Ht 5' 10\" (1.778 m)   Wt 74.8 kg (165 lb)   SpO2 98%   BMI 23.68 kg/m²       Intake/Output Summary (Last 24 hours) at 2020 0939  Last data filed at 2020 1200  Gross per 24 hour   Intake 240 ml   Output    Net 240 ml        PHYSICAL EXAM:  General   well developed, well nourished, appears stated age, in no acute distress  EENT  Normocephalic, Atraumatic, PERRLA, EOMI, sclera clear, nares clear, pharynx normal  Neck   Supple without nodes or mass. No thyromegaly or bruit  Respiratory   Clear To Auscultation bilaterally - no wheezes, rales, rhonchi, or crackles  Cardiology  Regular Rate and Rythmn  - no murmurs, rubs or gallops  Abdominal  Soft, non-tender, non-distended, positive bowel sounds  Extremities  No clubbing, cyanosis, or edema. Pulses intact. Back  No spinal or muscle pain. No CVAT. Skin  Normal skin turgor. No rashes or skin ulcers noted  Neurological  No focal neurological deficits noted  Psychological  Oriented x 3. Normal affect. Lab Data   Recent Results (from the past 12 hour(s))   LIPASE    Collection Time: 05/12/20  2:00 AM   Result Value Ref Range    Lipase 794 (H) 73 - 393 U/L         Medications: Reviewed      Date/Time:  5/12/2020    I have examined the patient. I have reviewed the chart and agree with the documentation recorded by the NP, including the assessment, treatment plan, and disposition.       Cristina Holstein, MD

## 2020-05-12 NOTE — PROGRESS NOTES
[address] May 12, 2020       RE: Ghulam Kong      To Whom It May Concern,    This is to certify that Ghulam Kong has been hospitalized at [address] [place] [address] from 5/8 to 5/12,2020 and  may return to work in a week if you continue to feel better. .    Please feel free to contact my office if you have any questions or concerns. Thank you for your assistance in this matter.       Sincerely,      Kaylan Ledbetter MD    [facility] Adult Hospitalist group  [facility] ,2nd floor  [address]:  [facility]:

## 2020-05-12 NOTE — DISCHARGE SUMMARY
Discharge Summary       PATIENT ID: Rissa Aguilar  MRN: [id number]   YOB: 1985    DATE OF ADMISSION: 5/8/2020  3:07 PM    DATE OF DISCHARGE: 05/12/2020     PRIMARY CARE PROVIDER: None     ATTENDING PHYSICIAN: Heriberto Mathew MD    DISCHARGING PROVIDER: Heriberto Mathew MD    To contact this individual call [phone] and ask the  to page. If unavailable ask to be transferred the Adult Hospitalist Department. CONSULTATIONS: IP CONSULT TO GASTROENTEROLOGY    PROCEDURES/SURGERIES: * No surgery found *    ADMITTING [address] [place] [address] COURSE:   Admission Summary: This is a 78-year-old  male who presented to the ED on 5/8 for abdominal pain, nausea and vomiting. His past medical history significant for alcoholic pancreatitis        DISCHARGE DIAGNOSES / PLAN:    Acute alcohol induced pancreatitis. CAT scan of the abdomen on admission showed evidence consistent with acute pancreatitis with no identified complication. Patient was admitted and managed with supportive therapy: N.p.o., IV fluids and pain control. Clinically symptom resolved quickly however the lipase lingered and GI watching inpatient until the lipase declined significantly. Finally the lipase was under 1000, patient was pain-free for more than 48 hours without any pain medication, he tolerated diet and was discharged home in a stable condition. He is strongly counseled and advised to quit drinking, he is really motivated. He will also follow-up with GI in a couple weeks as outpatient     Dehydration due to the acute pancreatitis: Resolved     Leukocytosis which is reactive due to the pancreatitis, already improving. Resolved. Alcohol abuse without evidence of withdrawal thus far.   Extensively counseled advised to stay sober      PENDING TEST RESULTS:   At the time of discharge the following test results are still pending: None    FOLLOW UP APPOINTMENTS:    Follow-up Information     Follow up With Specialties Details Why Contact Info    Percy Landau, MD Gastroenterology  Call office to schedule appointment [address] [facility]  [address] [facility]  [phone]      None    None (145) Patient stated that they have no PCP               DIET: Regular Diet  =  ACTIVITY: Activity as tolerated        DISCHARGE MEDICATIONS:  Current Discharge Medication List      CONTINUE these medications which have NOT CHANGED    Details   ibuprofen (MOTRIN) 200 mg tablet Take 200-400 mg by mouth as needed for Pain.      multivitamin (ONE A DAY) tablet Take 1 Tab by mouth daily. cetirizine (ZyrTEC) 10 mg tablet Take 10 mg by mouth daily as needed for Allergies. NOTIFY YOUR PHYSICIAN FOR ANY OF THE FOLLOWING:   Fever over 101 degrees for 24 hours. Chest pain, shortness of breath, fever, chills, nausea, vomiting, diarrhea, change in mentation, falling, weakness, bleeding. Severe pain or pain not relieved by medications. Or, any other signs or symptoms that you may have questions about. DISPOSITION:   x Home With:   OT  PT  HH  RN       Long term SNF/Inpatient Rehab    Independent/assisted living    Hospice    Other:       PATIENT CONDITION AT DISCHARGE:     Functional status    Poor     Deconditioned    x Independent      Cognition   x Lucid     Forgetful     Dementia      Catheters/lines (plus indication)    Strauss     PICC     PEG    x None      Code status    x Full code     DNR      PHYSICAL EXAMINATION AT DISCHARGE:  General:          Alert, cooperative, no distress, appears stated age. HEENT:           Atraumatic, anicteric sclerae, pink conjunctivae                          No oral ulcers, mucosa moist, throat clear, dentition fair  Neck:               Supple, symmetrical  Lungs:             Clear to auscultation bilaterally. No Wheezing or Rhonchi. No rales. Chest wall:      No tenderness  No Accessory muscle use.   Heart:              Regular  rhythm,  No  murmur   No edema  Abdomen: Soft, non-tender. Not distended. Bowel sounds normal  Extremities:     No cyanosis. No clubbing,                            Skin turgor normal, Capillary refill normal  Skin:                Not pale. Not Jaundiced  No rashes   Psych:             Not anxious or agitated. Neurologic:      Alert, moves all extremities, answers questions appropriately and responds to commands       CHRONIC MEDICAL DIAGNOSES:  Problem List as of 2020 Never Reviewed          Codes Class Noted - Resolved    Acute pancreatitis ICD-10-CM: K85.90  ICD-9-CM: 577.0  2019 - Present              Greater than 30 minutes were spent with the patient on counseling and coordination of care    Signed:    Marily Rebollar MD  2020  2:57 PM

## 2020-05-12 NOTE — PROGRESS NOTES
Bedside and Verbal shift change report given to Leonid Ulrich 83 (oncoming nurse) by Usama Guillen RN (offgoing nurse). Report included the following information SBAR and Kardex.

## 2020-05-12 NOTE — PROGRESS NOTES
Bedside shift change report given to RADHA Lee (oncoming nurse) by Gama Camacho RN (offgoing nurse). Report included the following information SBAR and Kardex.

## 2022-03-19 PROBLEM — K85.90 ACUTE PANCREATITIS: Status: ACTIVE | Noted: 2019-07-19

## 2023-05-11 RX ORDER — CETIRIZINE HYDROCHLORIDE 10 MG/1
TABLET ORAL DAILY PRN
COMMUNITY

## 2023-05-11 RX ORDER — IBUPROFEN 200 MG
TABLET ORAL PRN
COMMUNITY